# Patient Record
Sex: MALE | Race: WHITE | NOT HISPANIC OR LATINO | Employment: FULL TIME | URBAN - METROPOLITAN AREA
[De-identification: names, ages, dates, MRNs, and addresses within clinical notes are randomized per-mention and may not be internally consistent; named-entity substitution may affect disease eponyms.]

---

## 2018-05-10 ENCOUNTER — OFFICE VISIT (OUTPATIENT)
Dept: FAMILY MEDICINE CLINIC | Facility: CLINIC | Age: 53
End: 2018-05-10
Payer: COMMERCIAL

## 2018-05-10 VITALS
HEIGHT: 70 IN | TEMPERATURE: 98.8 F | HEART RATE: 96 BPM | RESPIRATION RATE: 18 BRPM | SYSTOLIC BLOOD PRESSURE: 124 MMHG | DIASTOLIC BLOOD PRESSURE: 86 MMHG | WEIGHT: 278.4 LBS | BODY MASS INDEX: 39.86 KG/M2

## 2018-05-10 DIAGNOSIS — Z11.59 ENCOUNTER FOR HEPATITIS C SCREENING TEST FOR LOW RISK PATIENT: ICD-10-CM

## 2018-05-10 DIAGNOSIS — L40.9 PSORIASIS: ICD-10-CM

## 2018-05-10 DIAGNOSIS — R73.9 HYPERGLYCEMIA: ICD-10-CM

## 2018-05-10 DIAGNOSIS — Z12.5 SCREENING FOR PROSTATE CANCER: ICD-10-CM

## 2018-05-10 DIAGNOSIS — M72.2 PLANTAR FASCIITIS: ICD-10-CM

## 2018-05-10 DIAGNOSIS — J30.1 ALLERGIC RHINITIS DUE TO POLLEN, UNSPECIFIED SEASONALITY: Primary | ICD-10-CM

## 2018-05-10 DIAGNOSIS — Z00.00 ENCOUNTER FOR MEDICAL EXAMINATION TO ESTABLISH CARE: ICD-10-CM

## 2018-05-10 PROBLEM — Z01.89 PATIENT REQUESTED DIAGNOSTIC TESTING: Status: ACTIVE | Noted: 2018-05-10

## 2018-05-10 PROCEDURE — 99205 OFFICE O/P NEW HI 60 MIN: CPT | Performed by: INTERNAL MEDICINE

## 2018-05-10 RX ORDER — MONTELUKAST SODIUM 10 MG/1
10 TABLET ORAL
Qty: 30 TABLET | Refills: 6 | Status: SHIPPED | OUTPATIENT
Start: 2018-05-10 | End: 2018-12-14 | Stop reason: SDUPTHER

## 2018-05-10 NOTE — PATIENT INSTRUCTIONS
Obtain labs  Treatment for right foot- frozen water bottle, stretch with a band  Change to claritin or allegra once a day  Add singulair (montelukast ) at night for allergy   Consider weight watchers on line for weight management or [facility] managemetn  Limit carbohydrates     Plantar Fasciitis   AMBULATORY CARE:   Plantar fasciitis  is swelling of the plantar fascia  The plantar fascia is a band of fibers that connect your heel bone to the front of your foot  It helps support the arch of your foot and absorbs shock  Plantar fasciitis is caused by small tears in the plantar fascia  Over time, the tears cause swelling and irritation  Signs and symptoms:   · Pain near your heel, especially first thing in the morning    · Pain with prolonged standing, sitting, or walking    · Redness, swelling, or warmth over the injured part of your foot  Contact your healthcare provider or podiatrist if:   · Your pain and swelling increase  · You develop knee, hip, or back pain  · You have questions or concerns about your condition or care  Treatment  may include any of the following:  · Medicines  may be given to decrease swelling and pain  Steroids may be injected into your heel to decrease swelling and pain  · Shoe inserts, splints, or tape  help support your foot and decrease stress on your plantar fascia  A night splint may help stretch your plantar fascia while you sleep  · Stretches and exercises  can help decrease pain and swelling  They can also help strengthen the muscles that support your heel and foot  · Extracorporeal shockwave therapy (ESWT)  uses sound waves to decrease swelling of the plantar fascia  ESWT may be done if other treatments do not work  · Surgery  is rarely needed to separate the plantar fascia from your heel  Self-care:   · Wear your splint or shoe inserts as directed  You may need to wear a splint at night to keep your foot stretched while you sleep   This will help prevent sharp pain first thing in the morning  Shoe inserts will help decrease stress on your plantar fascia when you walk or exercise  · Rest as directed  Rest as much as possible to decrease swelling and prevent more damage  Ask your healthcare provider when you can return to your normal activities  · Apply ice on your plantar fascia  Ice helps prevent tissue damage and decreases swelling and pain  Fill a water bottle with water and freeze it  Wrap a towel around the bottle or cover it with a pillow case  Roll the water bottle under your foot for 10 minutes in the morning and after work  · Massage your plantar fascia as directed  This may help decrease swelling and pain  Roll a golf ball under your foot for 10 minutes  Repeat 3 times each day  · Go to physical therapy as directed  A physical therapist teaches you exercises to help improve movement and strength, and to decrease pain  Prevent plantar fasciitis:   · Maintain a healthy weight  This will help decrease stress on your feet  Ask your healthcare provider how much you should weigh  Ask him to help you create a weight loss plan if you are overweight  · Do low-impact exercises  Low-impact exercises decrease stress on your plantar fascia  Examples include swimming or bicycling  · Start new activities slowly  Increase the intensity and time gradually  · Wear shoes that fit well and support your arch  Replace your shoes before the padding or shock absorption wears out  Do not walk or  bare feet or sandals for long periods of time  · Stretch before you exercise  Ask your healthcare provider how to stretch your plantar fascia and calf muscles  Follow up with your healthcare provider or podiatrist as directed:  Write down your questions so you remember to ask them during your visits     © 2017 [address] Information is for End User's use only and may not be sold, redistributed or otherwise used for commercial purposes  All illustrations and images included in CareNotes® are the copyrighted property of A D A M , Inc  or Jose Brandon  The above information is an  only  It is not intended as medical advice for individual conditions or treatments  Talk to your doctor, nurse or pharmacist before following any medical regimen to see if it is safe and effective for you

## 2018-05-10 NOTE — PROGRESS NOTES
ASSESSMENT and PLAN:  Rodrigo Vergara is a 46 y o  male with:   Problem List Items Addressed This Visit     Allergic rhinitis due to pollen - Primary    Relevant Medications    montelukast (SINGULAIR) 10 mg tablet    Other Relevant Orders    Comprehensive metabolic panel    Psoriasis    Screening for prostate cancer    Relevant Orders    PSA, Total Screen    Encounter for hepatitis C screening test for low risk patient    Relevant Orders    Comprehensive metabolic panel    Hepatitis C antibody    Hepatitis B surface antigen    Hyperglycemia    Relevant Orders    Lipid panel    HEMOGLOBIN A1C W/ EAG ESTIMATION    Plantar fasciitis          SUBJECTIVE:  Rodrigo Vergara is a 46 y o  male who presents today with a chief complaint of Physical Exam and [address]    Patient is here to establish as a patient   He has painin right foot  He is a   He thinks he has plantar fascitis  He has been taking zyrtec for allergy sx  He had been on other meds but was unable to take it due to his job  He has post nasal drip and sinus congestion  He has a hx [id number] herniated disc from previous injury- he exercises to prevent issues  Original injury was 1992  He had colono dr Sreedhar Riddle 3-4 years ago  He would like to know his blood type  He has not had labs done but in the past was "pre-diabetic" - he has lost 15 lbs and is unable to exercise  He has a hx of depression  He was on zoloft and remeron- had parotid tumor- removed  Last tetanus shot was 2010  Pt had mono as a teen- was told he had " a touch of hepatitis"       Review of Systems   Constitutional: Negative  HENT: Positive for postnasal drip and sinus pressure  Negative for congestion, dental problem, drooling, ear discharge, ear pain, facial swelling, mouth sores, nosebleeds, rhinorrhea, sinus pain, sneezing, sore throat, tinnitus and trouble swallowing  Eyes: Negative  Respiratory: Negative      Cardiovascular: Positive for chest pain (left sided ches tdiscomfort- lasted seconds yesterday )  Negative for palpitations and leg swelling  Gastrointestinal: Negative  Endocrine: Negative  Genitourinary: Negative  Musculoskeletal: Positive for arthralgias (pain in right footright foot pain)  Negative for back pain, gait problem, joint swelling, myalgias, neck pain and neck stiffness  Skin: Negative  Allergic/Immunologic: Negative  Neurological: Negative  Hematological: Negative  Psychiatric/Behavioral: Negative  I have reviewed the patient's PMH, Social History, Medication List and Allergies  OBJECTIVE:  /86 (BP Location: Left arm, Patient Position: Sitting, Cuff Size: Adult)   Pulse 96   Temp 98 8 °F (37 1 °C) (Tympanic)   Resp 18   Ht 5' 9 75" (1 772 m)   Wt 126 kg (278 lb 6 4 oz)   BMI 40 23 kg/m²   Physical Exam   Constitutional: He is oriented to person, place, and time  HENT:   Head: Normocephalic and atraumatic  Right Ear: External ear normal    Left Ear: External ear normal    Nose: Nose normal    Mouth/Throat: Oropharynx is clear and moist    Eyes: Conjunctivae and EOM are normal  Pupils are equal, round, and reactive to light  Right eye exhibits no discharge  Left eye exhibits no discharge  Neck: Normal range of motion  Neck supple  No JVD present  No tracheal deviation present  No thyromegaly present  Cardiovascular: Normal rate, regular rhythm, normal heart sounds and intact distal pulses  Exam reveals no friction rub  No murmur heard  Pulmonary/Chest: Effort normal and breath sounds normal  No respiratory distress  He has no wheezes  He has no rales  Abdominal: Soft  Bowel sounds are normal  He exhibits no distension  There is no tenderness  There is no rebound  Musculoskeletal: Normal range of motion  He exhibits no edema, tenderness or deformity  Neurological: He is alert and oriented to person, place, and time  He has normal reflexes  No cranial nerve deficit   Coordination normal    Skin: Skin is warm and dry  No rash noted  No erythema  Psychiatric: He has a normal mood and affect  His behavior is normal  Judgment and thought content normal    Nursing note and vitals reviewed

## 2018-05-12 ENCOUNTER — APPOINTMENT (OUTPATIENT)
Dept: LAB | Facility: CLINIC | Age: 53
End: 2018-05-12
Payer: COMMERCIAL

## 2018-05-12 DIAGNOSIS — Z11.59 ENCOUNTER FOR HEPATITIS C SCREENING TEST FOR LOW RISK PATIENT: ICD-10-CM

## 2018-05-12 DIAGNOSIS — J30.1 ALLERGIC RHINITIS DUE TO POLLEN, UNSPECIFIED SEASONALITY: ICD-10-CM

## 2018-05-12 DIAGNOSIS — R73.9 HYPERGLYCEMIA: ICD-10-CM

## 2018-05-12 DIAGNOSIS — Z00.00 ENCOUNTER FOR MEDICAL EXAMINATION TO ESTABLISH CARE: ICD-10-CM

## 2018-05-12 DIAGNOSIS — Z12.5 SCREENING FOR PROSTATE CANCER: ICD-10-CM

## 2018-05-12 LAB
ALBUMIN SERPL BCP-MCNC: 4.1 G/DL (ref 3.5–5)
ALP SERPL-CCNC: 61 U/L (ref 46–116)
ALT SERPL W P-5'-P-CCNC: 31 U/L (ref 12–78)
ANION GAP SERPL CALCULATED.3IONS-SCNC: 7 MMOL/L (ref 4–13)
AST SERPL W P-5'-P-CCNC: 18 U/L (ref 5–45)
BILIRUB SERPL-MCNC: 1.04 MG/DL (ref 0.2–1)
BUN SERPL-MCNC: 17 MG/DL (ref 5–25)
CALCIUM SERPL-MCNC: 9.2 MG/DL (ref 8.3–10.1)
CHLORIDE SERPL-SCNC: 105 MMOL/L (ref 100–108)
CHOLEST SERPL-MCNC: 166 MG/DL (ref 50–200)
CO2 SERPL-SCNC: 28 MMOL/L (ref 21–32)
CREAT SERPL-MCNC: 1.05 MG/DL (ref 0.6–1.3)
EST. AVERAGE GLUCOSE BLD GHB EST-MCNC: 123 MG/DL
GFR SERPL CREATININE-BSD FRML MDRD: 80 ML/MIN/1.73SQ M
GLUCOSE P FAST SERPL-MCNC: 105 MG/DL (ref 65–99)
HBA1C MFR BLD: 5.9 % (ref 4.2–6.3)
HDLC SERPL-MCNC: 42 MG/DL (ref 40–60)
LDLC SERPL CALC-MCNC: 93 MG/DL (ref 0–100)
NONHDLC SERPL-MCNC: 124 MG/DL
POTASSIUM SERPL-SCNC: 4.4 MMOL/L (ref 3.5–5.3)
PROT SERPL-MCNC: 7.7 G/DL (ref 6.4–8.2)
PSA SERPL-MCNC: 0.8 NG/ML (ref 0–4)
SODIUM SERPL-SCNC: 140 MMOL/L (ref 136–145)
TRIGL SERPL-MCNC: 154 MG/DL

## 2018-05-12 PROCEDURE — G0103 PSA SCREENING: HCPCS

## 2018-05-12 PROCEDURE — 87340 HEPATITIS B SURFACE AG IA: CPT

## 2018-05-12 PROCEDURE — 36415 COLL VENOUS BLD VENIPUNCTURE: CPT

## 2018-05-12 PROCEDURE — 80053 COMPREHEN METABOLIC PANEL: CPT

## 2018-05-12 PROCEDURE — 83036 HEMOGLOBIN GLYCOSYLATED A1C: CPT

## 2018-05-12 PROCEDURE — 80061 LIPID PANEL: CPT

## 2018-05-12 PROCEDURE — 86803 HEPATITIS C AB TEST: CPT

## 2018-05-13 LAB
HBV SURFACE AG SER QL: NORMAL
HCV AB SER QL: NORMAL

## 2018-12-14 DIAGNOSIS — J30.1 ALLERGIC RHINITIS DUE TO POLLEN, UNSPECIFIED SEASONALITY: ICD-10-CM

## 2018-12-14 RX ORDER — MONTELUKAST SODIUM 10 MG/1
10 TABLET ORAL
Qty: 90 TABLET | Refills: 2 | Status: SHIPPED | OUTPATIENT
Start: 2018-12-14 | End: 2019-04-30 | Stop reason: SDUPTHER

## 2018-12-14 NOTE — PROGRESS NOTES
Pt's wife, Melanie Vang, in for a visit today and requesting a refill, approved, schedule 6 month f/u

## 2019-01-14 ENCOUNTER — OFFICE VISIT (OUTPATIENT)
Dept: FAMILY MEDICINE CLINIC | Facility: CLINIC | Age: 54
End: 2019-01-14
Payer: COMMERCIAL

## 2019-01-14 VITALS
WEIGHT: 287 LBS | BODY MASS INDEX: 41.09 KG/M2 | DIASTOLIC BLOOD PRESSURE: 82 MMHG | HEIGHT: 70 IN | RESPIRATION RATE: 16 BRPM | TEMPERATURE: 98 F | HEART RATE: 64 BPM | SYSTOLIC BLOOD PRESSURE: 142 MMHG

## 2019-01-14 DIAGNOSIS — R73.03 PREDIABETES: ICD-10-CM

## 2019-01-14 DIAGNOSIS — E66.01 CLASS 3 SEVERE OBESITY DUE TO EXCESS CALORIES WITHOUT SERIOUS COMORBIDITY WITH BODY MASS INDEX (BMI) OF 40.0 TO 44.9 IN ADULT (HCC): Primary | ICD-10-CM

## 2019-01-14 DIAGNOSIS — R03.0 ELEVATED BP WITHOUT DIAGNOSIS OF HYPERTENSION: ICD-10-CM

## 2019-01-14 DIAGNOSIS — G47.19 DAYTIME HYPERSOMNOLENCE: ICD-10-CM

## 2019-01-14 PROBLEM — Z12.5 SCREENING FOR PROSTATE CANCER: Status: RESOLVED | Noted: 2018-05-10 | Resolved: 2019-01-14

## 2019-01-14 PROBLEM — E66.813 CLASS 3 SEVERE OBESITY DUE TO EXCESS CALORIES WITHOUT SERIOUS COMORBIDITY WITH BODY MASS INDEX (BMI) OF 40.0 TO 44.9 IN ADULT (HCC): Status: ACTIVE | Noted: 2019-01-14

## 2019-01-14 PROBLEM — M72.2 PLANTAR FASCIITIS: Status: RESOLVED | Noted: 2018-05-10 | Resolved: 2019-01-14

## 2019-01-14 PROBLEM — Z11.59 ENCOUNTER FOR HEPATITIS C SCREENING TEST FOR LOW RISK PATIENT: Status: RESOLVED | Noted: 2018-05-10 | Resolved: 2019-01-14

## 2019-01-14 PROBLEM — Z01.89 PATIENT REQUESTED DIAGNOSTIC TESTING: Status: RESOLVED | Noted: 2018-05-10 | Resolved: 2019-01-14

## 2019-01-14 PROCEDURE — 3008F BODY MASS INDEX DOCD: CPT | Performed by: FAMILY MEDICINE

## 2019-01-14 PROCEDURE — 99214 OFFICE O/P EST MOD 30 MIN: CPT | Performed by: FAMILY MEDICINE

## 2019-01-14 NOTE — PATIENT INSTRUCTIONS
Weight Management   AMBULATORY CARE:   Why it is important to manage your weight:  Being overweight increases your risk of health conditions such as heart disease, high blood pressure, type 2 diabetes, and certain types of cancer  It can also increase your risk for osteoarthritis, sleep apnea, and other respiratory problems  Aim for a slow, steady weight loss  Even a small amount of weight loss can lower your risk of health problems  How to lose weight safely:  A safe and healthy way to lose weight is to eat fewer calories and get regular exercise  You can lose up about 1 pound a week by decreasing the number of calories you eat by 500 calories each day  You can decrease calories by eating smaller portion sizes or by cutting out high-calorie foods  Read labels to find out how many calories are in the foods you eat  You can also burn calories with exercise such as walking, swimming, or biking  You will be more likely to keep weight off if you make these changes part of your lifestyle  Healthy meal plan for weight management:  A healthy meal plan includes a variety of foods, contains fewer calories, and helps you stay healthy  A healthy meal plan includes the following:  · Eat whole-grain foods more often  A healthy meal plan should contain fiber  Fiber is the part of grains, fruits, and vegetables that is not broken down by your body  Whole-grain foods are healthy and provide extra fiber in your diet  Some examples of whole-grain foods are whole-wheat breads and pastas, oatmeal, brown rice, and bulgur  · Eat a variety of vegetables every day  Include dark, leafy greens such as spinach, kale, kylah greens, and mustard greens  Eat yellow and orange vegetables such as carrots, sweet potatoes, and winter squash  · Eat a variety of fruits every day  Choose fresh or canned fruit (canned in its own juice or light syrup) instead of juice  Fruit juice has very little or no fiber  · Eat low-fat dairy foods  Drink fat-free (skim) milk or 1% milk  Eat fat-free yogurt and low-fat cottage cheese  Try low-fat cheeses such as mozzarella and other reduced-fat cheeses  · Choose meat and other protein foods that are low in fat  Choose beans or other legumes such as split peas or lentils  Choose fish, skinless poultry (chicken or turkey), or lean cuts of red meat (beef or pork)  Before you cook meat or poultry, cut off any visible fat  · Use less fat and oil  Try baking foods instead of frying them  Add less fat, such as margarine, sour cream, regular salad dressing and mayonnaise to foods  Eat fewer high-fat foods  Some examples of high-fat foods include french fries, doughnuts, ice cream, and cakes  · Eat fewer sweets  Limit foods and drinks that are high in sugar  This includes candy, cookies, regular soda, and sweetened drinks  Ways to decrease calories:   · Eat smaller portions  ¨ Use a small plate with smaller servings  ¨ Do not eat second helpings  ¨ When you eat at a restaurant, ask for a box and place half of your meal in the box before you eat  ¨ Share an entrée with someone else  · Replace high-calorie snacks with healthy, low-calorie snacks  ¨ Choose fresh fruit, vegetables, fat-free rice cakes, or air-popped popcorn instead of potato chips, nuts, or chocolate  ¨ Choose water or calorie-free drinks instead of soda or sweetened drinks  · Eat regular meals  Skipping meals can lead to overeating later in the day  Eat a healthy snack in place of a meal if you do not have time to eat a regular meal      · Do not shop for groceries when you are hungry  You may be more likely to make unhealthy food choices  Take a grocery list of healthy foods and shop after you have eaten  Exercise:  Exercise at least 30 minutes per day on most days of the week  Some examples of exercise include walking, biking, dancing, and swimming   You can also fit in more physical activity by taking the stairs instead of the elevator or parking farther away from stores  Ask your healthcare provider about the best exercise plan for you  Other things to consider as you try to lose weight:   · Be aware of situations that may give you the urge to overeat, such as eating while watching television  Find ways to avoid these situations  For example, read a book, go for a walk, or do crafts  · Meet with a weight loss support group or friends who are also trying to lose weight  This may help you stay motivated to continue working on your weight loss goals  © 2017 [address] [facility] Information is for End User's use only and may not be sold, redistributed or otherwise used for commercial purposes  All illustrations and images included in CareNotes® are the copyrighted property of [employer]  or Jose Brandon  The above information is an  only  It is not intended as medical advice for individual conditions or treatments  Talk to your doctor, nurse or pharmacist before following any medical regimen to see if it is safe and effective for you  DASH Eating Plan   AMBULATORY CARE:   The DASH (Dietary Approaches to Stop Hypertension) Eating Plan  is designed to help prevent or lower high blood pressure  It can also help to lower LDL (bad) cholesterol and decrease your risk for heart disease  The plan is low in sodium, sugar, unhealthy fats, and total fat  It is high in potassium, calcium, magnesium, and fiber  These nutrients are added when you eat more fruits, vegetables, and whole grains  Your sodium limit each day: Your dietitian will tell you how much sodium is safe for you to have each day  People with high blood pressure should have no more than 1,500 to 2,300 mg of sodium in a day  A teaspoon (tsp) of salt has 2,300 mg of sodium  This may seem like a difficult goal, but small changes to the foods you eat can make a big difference   Your healthcare provider or dietitian can help you create a meal plan that follows your sodium limit  How to limit sodium:   · Read food labels  Food labels can help you choose foods that are low in sodium  The amount of sodium is listed in milligrams (mg)  The % Daily Value (DV) column tells you how much of your daily needs are met by 1 serving of the food for each nutrient listed  Choose foods that have less than 5% of the DV of sodium  These foods are considered low in sodium  Foods that have 20% or more of the DV of sodium are considered high in sodium  Avoid foods that have more than 300 mg of sodium in each serving  Choose foods that say low-sodium, reduced-sodium, or no salt added on the food label  · Avoid salt  Do not salt food at the table, and add very little salt to foods during cooking  Use herbs and spices, such as onions, garlic, and salt-free seasonings to add flavor to foods  Try lemon or lime juice or vinegar to give foods a tart flavor  Use hot peppers or a small amount of hot pepper sauce to add a spicy flavor to foods  · Ask about salt substitutes  Ask your healthcare provider if you may use salt substitutes  Some salt substitutes have ingredients that can be harmful if you have certain health conditions  · Choose foods carefully at restaurants  Meals from restaurants, especially fast food restaurants, are often high in sodium  Some restaurants have nutrition information that tells you the amount of sodium in their foods  Ask to have your food prepared with less, or no salt  What you need to know about fats:   · Include healthy fats  Examples are unsaturated fats and omega-3 fatty acids  Unsaturated fats are found in soybean, canola, olive, or sunflower oil, and liquid and soft tub margarines  Omega-3 fatty acids are found in fatty fish, such as salmon, tuna, mackerel, and sardines  It is also found in flaxseed oil and ground flaxseed  · Avoid unhealthy fats    Do not eat unhealthy fats, such as saturated fats and trans fats  Saturated fats are found in foods that contain fat from animals  Examples are fatty meats, whole milk, butter, cream, and other dairy foods  It is also found in shortening, stick margarine, palm oil, and coconut oil  Trans fats are found in fried foods, crackers, chips, and baked goods made with margarine or shortening  Foods to include: With the DASH eating plan, you need to eat a certain number of servings from each food group  This will help you get enough of certain nutrients and limit others  The amount of servings you should eat depends on how many calories you need  Your dietitian can tell you how many calories you need  The number of servings listed next to the food groups below are for people who need about 2,000 calories each day    · Grains:  6 to 8 servings (3 of these servings should be whole-grain foods)    ¨ 1 slice of whole-grain bread     ¨ 1 ounce of dry cereal    ¨ ½ cup of cooked cereal, pasta, or brown rice    · Vegetables and fruits:  4 to 5 servings of fruits and 4 to 5 servings of vegetables    ¨ 1 medium fruit    ¨ ½ cup of frozen, canned (no added salt), or chopped fresh vegetables     ¨ ½ cup of fresh, frozen, dried, or canned fruit (canned in light syrup or fruit juice)    ¨ ½ cup of vegetable or fruit juice    · Dairy:  2 to 3 servings    ¨ 1 cup of nonfat (skim) or 1% milk    ¨ 1½ ounces of fat-free or low-fat cheese    ¨ 6 ounces of nonfat or low-fat yogurt    · Lean meat, poultry, and fish:  6 ounces or less    [employer] (chicken, turkey) with no skin    ¨ Fish (especially fatty fish, such as salmon, fresh tuna, or mackerel)    ¨ Lean beef and pork (loin, round, extra lean hamburger)    ¨ Egg whites and egg substitutes    · Nuts, seeds, and legumes:  4 to 5 servings each week    ¨ ½ cup of cooked beans and peas    ¨ 1½ ounces of unsalted nuts    ¨ 2 tablespoons of peanut butter or seeds    · Sweets and added sugars:  5 or less each week    ¨ 1 tablespoon of sugar, jelly, or jam    ¨ ½ cup of sorbet or gelatin    ¨ 1 cup of lemonade    · Fats:  2 to 3 servings each week    ¨ 1 teaspoon of soft margarine or vegetable oil    ¨ 1 tablespoon of mayonnaise    ¨ 2 tablespoons of salad dressing  Foods to avoid:   · Grains:      [employer], such as doughnuts, pastries, cookies, and biscuits (high in fat and sugar)    ¨ Mixes for cornbread and biscuits, packaged foods, such as bread stuffing, rice and pasta mixes, macaroni and cheese, and instant cereals (high in sodium)    · Fruits and vegetables:      ¨ Regular, canned vegetables (high in sodium)    ¨ Sauerkraut, pickled vegetables, and other foods prepared in brine (high in sodium)    ¨ Fried vegetables or vegetables in butter or high-fat sauces    ¨ Fruit in cream or butter sauce (high in fat)    · Dairy:      ¨ Whole milk, 2% milk, and cream (high in fat)    ¨ Regular cheese and processed cheese (high in fat and sodium)    · Meats and protein foods:      ¨ Smoked or cured meat, such as corned beef, benedict, ham, hot dogs, and sausage (high in fat and sodium)    ¨ Canned beans and canned meats or spreads, such as potted meats, sardines, anchovies, and imitation seafood (high in sodium)    ¨ Deli or lunch meats, such as bologna, ham, turkey, and roast beef (high in sodium)    ¨ High-fat meat (T-bone steak, regular hamburger, and ribs)    ¨ Whole eggs and egg yolks (high in fat)    · Other:      ¨ Seasonings made with salt, such as garlic salt, celery salt, onion salt, seasoned salt, meat tenderizers, and monosodium glutamate (MSG)    ¨ Miso soup and canned or dried soup mixes (high in sodium)    ¨ Regular soy sauce, barbecue sauce, teriyaki sauce, steak sauce, Worcestershire sauce, and most flavored vinegars (high in sodium)    ¨ Regular condiments, such as mustard, ketchup, and salad dressings (high in sodium)    ¨ Gravy and sauces, such as Steven or cheese sauces (high in sodium and fat)    ¨ Drinks high in sugar, such as soda or fruit drinks    ¨ Snack foods, such as salted chips, popcorn, pretzels, pork rinds, salted crackers, and salted nuts    ¨ Frozen foods, such as dinners, entrees, vegetables with sauces, and breaded meats (high in sodium)  Other guidelines to follow:   · Maintain a healthy weight  Your risk for heart disease is higher if you are overweight  Your healthcare provider may suggest that you lose weight if you are overweight  You can lose weight by eating fewer calories and foods that have added sugars and fat  The DASH meal plan can help you do this  Decrease calories by eating smaller portions at each meal and fewer snacks  Ask your healthcare provider for more information about how to lose weight  · Exercise regularly  Regular exercise can help you reach or maintain a healthy weight  Regular exercise can also help decrease your blood pressure and improve your cholesterol levels  Get 30 minutes or more of moderate exercise each day of the week  To lose weight, get at least 60 minutes of exercise  Talk to your healthcare provider about the best exercise program for you  · Limit alcohol  Women should limit alcohol to 1 drink a day  Men should limit alcohol to 2 drinks a day  A drink of alcohol is 12 ounces of beer, 5 ounces of wine, or 1½ ounces of liquor  © 2017 [address] [facility] Information is for End User's use only and may not be sold, redistributed or otherwise used for commercial purposes  All illustrations and images included in CareNotes® are the copyrighted property of [employer]  or Jose Brandon  The above information is an  only  It is not intended as medical advice for individual conditions or treatments  Talk to your doctor, nurse or pharmacist before following any medical regimen to see if it is safe and effective for you

## 2019-01-14 NOTE — PROGRESS NOTES
FAMILY MEDICINE PROGRESS NOTE  Nirmala Palumbo 53 y o  male   DATE: January 14, 2019     ASSESSMENT and PLAN:  Nirmala Palumbo is a 53 y o  male with:     Class 3 severe obesity due to excess calories without serious comorbidity with body mass index (BMI) of 40 0 to 44 9 in adult [facility])  Wt Readings from Last 3 Encounters:   01/14/19 130 kg (287 lb)   05/10/18 126 kg (278 lb 6 4 oz)     BMI Counseling: Body mass index is 41 48 kg/m²  Discussed the patient's BMI with him  The BMI is above average  BMI counseling and education was provided to the patient  Nutrition recommendations include reducing portion sizes, decreasing overall calorie intake, 3-5 servings of fruits/vegetables daily, consuming healthier snacks, decreasing soda and/or juice intake, moderation in carbohydrate intake and reducing intake of cholesterol  Exercise recommendations include moderate aerobic physical activity for 150 minutes/week and exercising 3-5 times per week  Prediabetes  Lab Results   Component Value Date    HGBA1C 5 9 05/12/2018     Nutrition referral, reviewed healthy diet and weight loss, recheck at next follow-up    Elevated BP without diagnosis of hypertension  BP Readings from Last 3 Encounters:   01/14/19 142/82   05/10/18 124/86     Return for nurse visit in 2-4 weeks to recheck blood pressure  Attempt weight loss, given handout for dash diet, reviewed lifestyle modifications    Daytime hypersomnolence  Patient has daytime hypersomnolence, increased weight with morbid obesity, increased neck girth and abdominal obesity with known snoring, no witnessed apneic events though  [place] sleepiness Score of 8  Patient is required to have a sleep study per DOT requirements  Order placed, if positive, will have to follow up with Sleep Medicine  Reviewed weight loss and lifestyle modifications to decrease risk  RTC 2-4 weeks for nurse visit for BP check  Return to clinic 3 months or sooner p r n      SUBJECTIVE:  Nirmala Palumbo is a 53 y o  male who presents today with a chief complaint of Weight Gain and Sleep Apnea (Discuss sleep apnea per DOT Physicain)  Pt is here for possible sleep apnea  He works as a  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 [employer] and recently had a DOT physical and told because of his weight/neck girth/body mass  They told him he needs a sleep study  He was given a 3 month card until he gets his study done  Over the last 2 months, he has been struggling with a ruptured tendon  During this time he had been inactive, and drinking more alcohol  His wife says he snores, and he is tired/sleepy during the daytime  Current alcohol use: 3 beers, 2 shots/weekend      Review of Systems   Eyes: Negative for visual disturbance  Respiratory: Negative for shortness of breath  Cardiovascular: Negative for chest pain  Neurological: Negative for headaches  I have reviewed the patient's PMH, Social History, Medication List and Allergies as appropriate  Sitting and reading: Would never doze  Watching TV: Moderate chance of dozing  Sitting, inactive in a public place (e g  a theatre or a meeting): Would never doze  As a passenger in a car for an hour without a break: High chance of dozing  Lying down to rest in the afternoon when circumstances permit: High chance of dozing  Sitting and talking to someone: Would never doze  Sitting quietly after a lunch without alcohol: Would never doze  In a car, while stopped for a few minutes in traffic: Would never doze  Total score: 8    OBJECTIVE:  /82 (BP Location: Left arm, Patient Position: Sitting, Cuff Size: Large)   Pulse 64   Temp 98 °F (36 7 °C)   Resp 16   Ht 5' 9 75" (1 772 m)   Wt 130 kg (287 lb)   BMI 41 48 kg/m²    Physical Exam   Constitutional: He appears well-developed and well-nourished  No distress  obese   Cardiovascular: Normal rate, regular rhythm and normal heart sounds  Pulmonary/Chest: Effort normal and breath sounds normal  No respiratory distress   He has no wheezes  He has no rales  Skin: He is not diaphoretic  Vitals reviewed  Yohannes Mckenzie MD    Note: Portions of the record may have been created with voice recognition software  Occasional wrong word or "sound a like" substitutions may have occurred due to the inherent limitations of voice recognition software  Read the chart carefully and recognize, using context, where substitutions have occurred

## 2019-01-14 NOTE — ASSESSMENT & PLAN NOTE
Lab Results   Component Value Date    HGBA1C 5 9 05/12/2018     Nutrition referral, reviewed healthy diet and weight loss, recheck at next follow-up

## 2019-01-14 NOTE — ASSESSMENT & PLAN NOTE
BP Readings from Last 3 Encounters:   01/14/19 142/82   05/10/18 124/86     Return for nurse visit in 2-4 weeks to recheck blood pressure  Attempt weight loss, given handout for dash diet, reviewed lifestyle modifications

## 2019-01-14 NOTE — ASSESSMENT & PLAN NOTE
Patient has daytime hypersomnolence, increased weight with morbid obesity, increased neck girth and abdominal obesity with known snoring, no witnessed apneic events though  [place] sleepiness Score of 8  Patient is required to have a sleep study per DOT requirements  Order placed, if positive, will have to follow up with Sleep Medicine  Reviewed weight loss and lifestyle modifications to decrease risk

## 2019-01-14 NOTE — ASSESSMENT & PLAN NOTE
Wt Readings from Last 3 Encounters:   01/14/19 130 kg (287 lb)   05/10/18 126 kg (278 lb 6 4 oz)     BMI Counseling: Body mass index is 41 48 kg/m²  Discussed the patient's BMI with him  The BMI is above average  BMI counseling and education was provided to the patient  Nutrition recommendations include reducing portion sizes, decreasing overall calorie intake, 3-5 servings of fruits/vegetables daily, consuming healthier snacks, decreasing soda and/or juice intake, moderation in carbohydrate intake and reducing intake of cholesterol  Exercise recommendations include moderate aerobic physical activity for 150 minutes/week and exercising 3-5 times per week

## 2019-01-17 ENCOUNTER — TELEPHONE (OUTPATIENT)
Dept: SLEEP CENTER | Facility: CLINIC | Age: 54
End: 2019-01-17

## 2019-01-17 NOTE — TELEPHONE ENCOUNTER
----- Message from Jose C Brooks DO sent at 1/17/2019  1:50 PM EST -----  Chart reviewed  Study approved   ----- Message -----  From: Suri French  Sent: 1/16/2019   8:56 AM  To: Jose C Brooks DO    This sleep study needs approval      If approved please sign and return to clerical pool  If denied please include reasons why  Also provide alternative testing if warranted  Please sign and return to clerical pool

## 2019-02-05 ENCOUNTER — TELEPHONE (OUTPATIENT)
Dept: FAMILY MEDICINE CLINIC | Facility: CLINIC | Age: 54
End: 2019-02-05

## 2019-02-05 DIAGNOSIS — G47.19 DAYTIME HYPERSOMNOLENCE: Primary | ICD-10-CM

## 2019-02-05 NOTE — TELEPHONE ENCOUNTER
Called [facility] services  Patient's in lab study denied due to no witnessed apnea episodes  Can we change this to home study please?

## 2019-02-05 NOTE — TELEPHONE ENCOUNTER
I called patient, he saw Dr Guru Lindquist at [address] for  [phone]   I called their office and spoke with Roselyn Sanchez, the DOT will accept a home study, please change

## 2019-02-05 NOTE — TELEPHONE ENCOUNTER
Call pt and make sure home study is still ok for his DOT requirements, if they will accept a home study, then I'll proceed, thanks

## 2019-02-05 NOTE — TELEPHONE ENCOUNTER
Called central scheduling, cancelled diagnostic  Scheduled Home study for April 2/3rd at [facility]   Called patient and advised of changes

## 2019-02-07 ENCOUNTER — TELEPHONE (OUTPATIENT)
Dept: SLEEP CENTER | Facility: CLINIC | Age: 54
End: 2019-02-07

## 2019-02-07 NOTE — TELEPHONE ENCOUNTER
----- Message from Марина Bryant MD sent at 2/7/2019 11:20 AM EST -----  Approved  ----- Message -----  From: Saundra Yu: 2/7/2019   9:47 AM  To: Sleep Medicine [facility] [place], #    PLEASE REVIEW FOR APPROVAL OR DENIAL AND WHY

## 2019-03-15 ENCOUNTER — TRANSCRIBE ORDERS (OUTPATIENT)
Dept: SLEEP CENTER | Facility: CLINIC | Age: 54
End: 2019-03-15

## 2019-03-17 ENCOUNTER — HOSPITAL ENCOUNTER (OUTPATIENT)
Dept: SLEEP CENTER | Facility: CLINIC | Age: 54
Discharge: HOME | End: 2019-03-17
Payer: COMMERCIAL

## 2019-03-17 DIAGNOSIS — G47.19 DAYTIME HYPERSOMNOLENCE: ICD-10-CM

## 2019-03-17 PROCEDURE — G0399 HOME SLEEP TEST/TYPE 3 PORTA: HCPCS

## 2019-03-19 ENCOUNTER — TELEPHONE (OUTPATIENT)
Dept: FAMILY MEDICINE CLINIC | Facility: CLINIC | Age: 54
End: 2019-03-19

## 2019-03-19 DIAGNOSIS — G47.33 OSA (OBSTRUCTIVE SLEEP APNEA): Primary | ICD-10-CM

## 2019-03-19 PROBLEM — G47.19 DAYTIME HYPERSOMNOLENCE: Status: RESOLVED | Noted: 2019-01-14 | Resolved: 2019-03-19

## 2019-03-19 NOTE — TELEPHONE ENCOUNTER
Please call Shanika Avina and let him know that I reviewed his sleep study was positive for mild to moderate ANNABEL  I would have him talk to sleep medicine about options, CPAP versus mandibular device  Weight loss will also help with this diagnosis  Thank you!

## 2019-03-20 ENCOUNTER — TELEPHONE (OUTPATIENT)
Dept: SLEEP CENTER | Facility: CLINIC | Age: 54
End: 2019-03-20

## 2019-03-20 NOTE — TELEPHONE ENCOUNTER
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

## 2019-03-22 ENCOUNTER — TELEPHONE (OUTPATIENT)
Dept: FAMILY MEDICINE CLINIC | Facility: CLINIC | Age: 54
End: 2019-03-22

## 2019-03-22 NOTE — TELEPHONE ENCOUNTER
Regarding: Test Results Question  Contact: [phone]  ----- Message from [address] [place] [address] [address], Generic sent at 3/22/2019  4:23 PM EDT -----    I have a question about HOME STUDY resulted on 3/18/19 at 18:13  Not so much a question  Just that the [facility]  Doctor responsible for my physical has received the sleep study results via fax and issued me a 2 year physical card  This is the best results that could have been hoped for  I am still going to follow up with the sleep center and continue to diet/loose weight  Thank you for your care, and also to the entire staff at your office

## 2019-04-22 ENCOUNTER — OFFICE VISIT (OUTPATIENT)
Dept: SLEEP CENTER | Facility: CLINIC | Age: 54
End: 2019-04-22
Payer: COMMERCIAL

## 2019-04-22 VITALS
SYSTOLIC BLOOD PRESSURE: 100 MMHG | DIASTOLIC BLOOD PRESSURE: 70 MMHG | BODY MASS INDEX: 39.4 KG/M2 | HEIGHT: 70 IN | WEIGHT: 275.25 LBS

## 2019-04-22 DIAGNOSIS — J30.1 ALLERGIC RHINITIS DUE TO POLLEN, UNSPECIFIED SEASONALITY: ICD-10-CM

## 2019-04-22 DIAGNOSIS — E66.01 CLASS 3 SEVERE OBESITY DUE TO EXCESS CALORIES WITHOUT SERIOUS COMORBIDITY WITH BODY MASS INDEX (BMI) OF 40.0 TO 44.9 IN ADULT (HCC): ICD-10-CM

## 2019-04-22 DIAGNOSIS — G47.33 OSA (OBSTRUCTIVE SLEEP APNEA): Primary | ICD-10-CM

## 2019-04-22 PROCEDURE — 99244 OFF/OP CNSLTJ NEW/EST MOD 40: CPT | Performed by: INTERNAL MEDICINE

## 2019-04-30 ENCOUNTER — OFFICE VISIT (OUTPATIENT)
Dept: FAMILY MEDICINE CLINIC | Facility: CLINIC | Age: 54
End: 2019-04-30
Payer: COMMERCIAL

## 2019-04-30 VITALS
OXYGEN SATURATION: 97 % | TEMPERATURE: 97 F | RESPIRATION RATE: 15 BRPM | HEIGHT: 70 IN | WEIGHT: 274 LBS | HEART RATE: 82 BPM | SYSTOLIC BLOOD PRESSURE: 130 MMHG | BODY MASS INDEX: 39.22 KG/M2 | DIASTOLIC BLOOD PRESSURE: 76 MMHG

## 2019-04-30 DIAGNOSIS — E66.01 CLASS 2 SEVERE OBESITY DUE TO EXCESS CALORIES WITH SERIOUS COMORBIDITY AND BODY MASS INDEX (BMI) OF 39.0 TO 39.9 IN ADULT (HCC): ICD-10-CM

## 2019-04-30 DIAGNOSIS — R73.03 PREDIABETES: ICD-10-CM

## 2019-04-30 DIAGNOSIS — G47.33 OSA (OBSTRUCTIVE SLEEP APNEA): Primary | ICD-10-CM

## 2019-04-30 DIAGNOSIS — R03.0 ELEVATED BP WITHOUT DIAGNOSIS OF HYPERTENSION: ICD-10-CM

## 2019-04-30 DIAGNOSIS — E78.2 MIXED HYPERLIPIDEMIA: ICD-10-CM

## 2019-04-30 DIAGNOSIS — J30.1 ALLERGIC RHINITIS DUE TO POLLEN, UNSPECIFIED SEASONALITY: ICD-10-CM

## 2019-04-30 PROBLEM — E66.812 CLASS 2 SEVERE OBESITY DUE TO EXCESS CALORIES WITH SERIOUS COMORBIDITY AND BODY MASS INDEX (BMI) OF 39.0 TO 39.9 IN ADULT (HCC): Status: ACTIVE | Noted: 2019-01-14

## 2019-04-30 PROCEDURE — 99214 OFFICE O/P EST MOD 30 MIN: CPT | Performed by: FAMILY MEDICINE

## 2019-04-30 PROCEDURE — 1111F DSCHRG MED/CURRENT MED MERGE: CPT | Performed by: FAMILY MEDICINE

## 2019-04-30 PROCEDURE — 3008F BODY MASS INDEX DOCD: CPT | Performed by: FAMILY MEDICINE

## 2019-04-30 RX ORDER — MONTELUKAST SODIUM 10 MG/1
10 TABLET ORAL
Qty: 90 TABLET | Refills: 1 | Status: SHIPPED | OUTPATIENT
Start: 2019-04-30 | End: 2019-09-09 | Stop reason: SDUPTHER

## 2019-05-20 ENCOUNTER — APPOINTMENT (OUTPATIENT)
Dept: LAB | Facility: CLINIC | Age: 54
End: 2019-05-20
Payer: COMMERCIAL

## 2019-05-20 DIAGNOSIS — R03.0 ELEVATED BP WITHOUT DIAGNOSIS OF HYPERTENSION: ICD-10-CM

## 2019-05-20 DIAGNOSIS — E78.2 MIXED HYPERLIPIDEMIA: ICD-10-CM

## 2019-05-20 DIAGNOSIS — R73.03 PREDIABETES: ICD-10-CM

## 2019-05-20 LAB
ANION GAP SERPL CALCULATED.3IONS-SCNC: 9 MMOL/L (ref 4–13)
BUN SERPL-MCNC: 11 MG/DL (ref 5–25)
CALCIUM SERPL-MCNC: 8.8 MG/DL (ref 8.3–10.1)
CHLORIDE SERPL-SCNC: 105 MMOL/L (ref 100–108)
CHOLEST SERPL-MCNC: 164 MG/DL (ref 50–200)
CO2 SERPL-SCNC: 27 MMOL/L (ref 21–32)
CREAT SERPL-MCNC: 0.98 MG/DL (ref 0.6–1.3)
EST. AVERAGE GLUCOSE BLD GHB EST-MCNC: 120 MG/DL
GFR SERPL CREATININE-BSD FRML MDRD: 86 ML/MIN/1.73SQ M
GLUCOSE P FAST SERPL-MCNC: 96 MG/DL (ref 65–99)
HBA1C MFR BLD: 5.8 % (ref 4.2–6.3)
HDLC SERPL-MCNC: 44 MG/DL (ref 40–60)
LDLC SERPL CALC-MCNC: 104 MG/DL (ref 0–100)
POTASSIUM SERPL-SCNC: 4.6 MMOL/L (ref 3.5–5.3)
SODIUM SERPL-SCNC: 141 MMOL/L (ref 136–145)
TRIGL SERPL-MCNC: 79 MG/DL

## 2019-05-20 PROCEDURE — 80061 LIPID PANEL: CPT

## 2019-05-20 PROCEDURE — 83036 HEMOGLOBIN GLYCOSYLATED A1C: CPT

## 2019-05-20 PROCEDURE — 36415 COLL VENOUS BLD VENIPUNCTURE: CPT

## 2019-05-20 PROCEDURE — 80048 BASIC METABOLIC PNL TOTAL CA: CPT

## 2019-07-02 DIAGNOSIS — Z12.11 COLON CANCER SCREENING: Primary | ICD-10-CM

## 2019-07-08 ENCOUNTER — TELEPHONE (OUTPATIENT)
Dept: FAMILY MEDICINE CLINIC | Facility: CLINIC | Age: 54
End: 2019-07-08

## 2019-07-08 NOTE — TELEPHONE ENCOUNTER
Patient said he had a colonsocopy competed in 2014 by Dr Adele Meza in [facility]   He was told he did not have to come backfor 10 years, he got a clean report  Does he have to repeat colonoscopy?     Dr Adele Meza  [phone]

## 2019-07-10 NOTE — TELEPHONE ENCOUNTER
Report received & scanned to VB from Dr Anna Naidu office  Dr Mady Simons reviewed report and recommended a 10 year recheck  So patient is not due until 2023

## 2019-09-09 DIAGNOSIS — J30.1 ALLERGIC RHINITIS DUE TO POLLEN, UNSPECIFIED SEASONALITY: ICD-10-CM

## 2019-09-09 RX ORDER — MONTELUKAST SODIUM 10 MG/1
10 TABLET ORAL
Qty: 90 TABLET | Refills: 1 | Status: SHIPPED | OUTPATIENT
Start: 2019-09-09 | End: 2020-02-05 | Stop reason: SDUPTHER

## 2019-09-09 NOTE — TELEPHONE ENCOUNTER
Patient requesting a RX refill on     SINGULAIR 10 mg daily at bedtime #[id number] 4/30/19

## 2020-02-05 DIAGNOSIS — J30.1 ALLERGIC RHINITIS DUE TO POLLEN, UNSPECIFIED SEASONALITY: ICD-10-CM

## 2020-02-05 RX ORDER — MONTELUKAST SODIUM 10 MG/1
10 TABLET ORAL
Qty: 90 TABLET | Refills: 0 | Status: SHIPPED | OUTPATIENT
Start: 2020-02-05 | End: 2020-02-19 | Stop reason: SDUPTHER

## 2020-02-19 ENCOUNTER — APPOINTMENT (OUTPATIENT)
Dept: LAB | Facility: CLINIC | Age: 55
End: 2020-02-19
Payer: COMMERCIAL

## 2020-02-19 ENCOUNTER — OFFICE VISIT (OUTPATIENT)
Dept: FAMILY MEDICINE CLINIC | Facility: CLINIC | Age: 55
End: 2020-02-19
Payer: COMMERCIAL

## 2020-02-19 VITALS
TEMPERATURE: 98.5 F | SYSTOLIC BLOOD PRESSURE: 124 MMHG | WEIGHT: 262.4 LBS | HEART RATE: 70 BPM | OXYGEN SATURATION: 98 % | BODY MASS INDEX: 37.56 KG/M2 | HEIGHT: 70 IN | DIASTOLIC BLOOD PRESSURE: 70 MMHG

## 2020-02-19 DIAGNOSIS — J30.1 ALLERGIC RHINITIS DUE TO POLLEN, UNSPECIFIED SEASONALITY: ICD-10-CM

## 2020-02-19 DIAGNOSIS — Z12.5 SCREENING FOR PROSTATE CANCER: ICD-10-CM

## 2020-02-19 DIAGNOSIS — R73.01 IMPAIRED FASTING GLUCOSE: ICD-10-CM

## 2020-02-19 DIAGNOSIS — R73.01 IMPAIRED FASTING GLUCOSE: Primary | ICD-10-CM

## 2020-02-19 DIAGNOSIS — E78.2 MIXED HYPERLIPIDEMIA: ICD-10-CM

## 2020-02-19 PROBLEM — R73.03 PREDIABETES: Status: RESOLVED | Noted: 2018-05-10 | Resolved: 2020-02-19

## 2020-02-19 LAB
ALBUMIN SERPL BCP-MCNC: 4.3 G/DL (ref 3.5–5)
ALP SERPL-CCNC: 65 U/L (ref 46–116)
ALT SERPL W P-5'-P-CCNC: 33 U/L (ref 12–78)
ANION GAP SERPL CALCULATED.3IONS-SCNC: 5 MMOL/L (ref 4–13)
AST SERPL W P-5'-P-CCNC: 18 U/L (ref 5–45)
BILIRUB SERPL-MCNC: 1.05 MG/DL (ref 0.2–1)
BUN SERPL-MCNC: 15 MG/DL (ref 5–25)
CALCIUM SERPL-MCNC: 9.5 MG/DL (ref 8.3–10.1)
CHLORIDE SERPL-SCNC: 109 MMOL/L (ref 100–108)
CHOLEST SERPL-MCNC: 179 MG/DL (ref 50–200)
CO2 SERPL-SCNC: 28 MMOL/L (ref 21–32)
CREAT SERPL-MCNC: 1.01 MG/DL (ref 0.6–1.3)
GFR SERPL CREATININE-BSD FRML MDRD: 83 ML/MIN/1.73SQ M
GLUCOSE P FAST SERPL-MCNC: 120 MG/DL (ref 65–99)
HDLC SERPL-MCNC: 46 MG/DL
LDLC SERPL CALC-MCNC: 103 MG/DL (ref 0–100)
NONHDLC SERPL-MCNC: 133 MG/DL
POTASSIUM SERPL-SCNC: 4.6 MMOL/L (ref 3.5–5.3)
PROT SERPL-MCNC: 7.9 G/DL (ref 6.4–8.2)
PSA SERPL-MCNC: 0.8 NG/ML (ref 0–4)
SL AMB POCT HEMOGLOBIN AIC: 5.6 (ref ?–6.5)
SODIUM SERPL-SCNC: 142 MMOL/L (ref 136–145)
TRIGL SERPL-MCNC: 149 MG/DL

## 2020-02-19 PROCEDURE — G0103 PSA SCREENING: HCPCS

## 2020-02-19 PROCEDURE — 80061 LIPID PANEL: CPT

## 2020-02-19 PROCEDURE — 83036 HEMOGLOBIN GLYCOSYLATED A1C: CPT | Performed by: FAMILY MEDICINE

## 2020-02-19 PROCEDURE — 99213 OFFICE O/P EST LOW 20 MIN: CPT | Performed by: FAMILY MEDICINE

## 2020-02-19 PROCEDURE — 36415 COLL VENOUS BLD VENIPUNCTURE: CPT

## 2020-02-19 PROCEDURE — 80053 COMPREHEN METABOLIC PANEL: CPT

## 2020-02-19 PROCEDURE — 1036F TOBACCO NON-USER: CPT | Performed by: FAMILY MEDICINE

## 2020-02-19 PROCEDURE — 3008F BODY MASS INDEX DOCD: CPT | Performed by: FAMILY MEDICINE

## 2020-02-19 RX ORDER — MONTELUKAST SODIUM 10 MG/1
10 TABLET ORAL
Qty: 90 TABLET | Refills: 2 | Status: SHIPPED | OUTPATIENT
Start: 2020-02-19 | End: 2020-08-26 | Stop reason: SDUPTHER

## 2020-02-19 NOTE — PROGRESS NOTES
Assessment/Plan:    No problem-specific Assessment & Plan notes found for this encounter  Diagnoses and all orders for this visit:    Impaired fasting glucose  -     POCT hemoglobin A1c, 5 6 normal recommend to continue a low carb diet  -     Comprehensive metabolic panel; Future    Mixed hyperlipidemia  -     Lipid panel; Future    Screening for prostate cancer  -     PSA, Total Screen; Future      Follow up in 1 year or as needed    Subjective:      Patient ID: Mohit Luo is a 63 y o  male  Patient is here to establish care  He has a history of impaired fasting glucose and is trying to lose weight and has lose close to 30 lbs in the last 1 year  He has a history of sleep apnea and saw his sleep doctor last year and was told that if he kept losing the weight he might not need treatment  Smoked for 15-20 years however quit  Has a history of right parotid gland cancer removed  The following portions of the patient's history were reviewed and updated as appropriate:   He  has a past medical history of Allergic (Almost life long sinus  Penicillin ), Hearing deficit, bilateral, HL (hearing loss) ([place] age), Lumbar herniated disc, Obesity (2006), and Psoriasis  He   Patient Active Problem List    Diagnosis Date Noted    Impaired fasting glucose 02/19/2020    Mixed hyperlipidemia 04/30/2019    ANNABEL (obstructive sleep apnea)     Class 2 severe obesity due to excess calories with serious comorbidity and body mass index (BMI) of 39 0 to 39 9 in adult [facility]) 01/14/2019    Allergic rhinitis due to pollen 05/10/2018    Psoriasis 05/10/2018    Screening for prostate cancer 05/10/2018     He  has a past surgical history that includes Hernia repair and Parotidectomy  His family history includes Heart murmur in his father; Hypertension in his mother; Psoriasis in his father, sister, and sister; Rheum arthritis in his mother; Stroke in his father; Thyroid disease in his sister    He  reports that he quit smoking about 17 years ago  His smoking use included cigarettes  He has a 20 00 pack-year smoking history  He has never used smokeless tobacco  He reports that he drinks about 12 0 standard drinks of alcohol per week  He reports that he does not use drugs  Current Outpatient Medications   Medication Sig Dispense Refill    montelukast (SINGULAIR) 10 mg tablet Take 1 tablet (10 mg total) by mouth daily at bedtime 90 tablet 0     No current facility-administered medications for this visit  Current Outpatient Medications on File Prior to Visit   Medication Sig    montelukast (SINGULAIR) 10 mg tablet Take 1 tablet (10 mg total) by mouth daily at bedtime     No current facility-administered medications on file prior to visit  He is allergic to penicillins       Review of Systems   Constitutional: Negative for activity change, appetite change, fatigue and fever  HENT: Negative for congestion and ear discharge  Respiratory: Negative for cough and shortness of breath  Cardiovascular: Negative for chest pain and palpitations  Gastrointestinal: Negative for diarrhea and nausea  Musculoskeletal: Negative for arthralgias and back pain  Skin: Negative for color change and rash  Neurological: Negative for dizziness and headaches  Psychiatric/Behavioral: Negative for agitation and behavioral problems  Objective:      /70   Pulse 70   Temp 98 5 °F (36 9 °C)   Ht 5' 10" (1 778 m)   Wt 119 kg (262 lb 6 4 oz)   SpO2 98%   BMI 37 65 kg/m²          Physical Exam   Constitutional: He is oriented to person, place, and time  He appears well-developed and well-nourished  No distress  Eyes: Pupils are equal, round, and reactive to light  No scleral icterus  Cardiovascular: Normal rate, regular rhythm and normal heart sounds  No murmur heard  Pulmonary/Chest: Effort normal and breath sounds normal  No respiratory distress  He has no wheezes  Abdominal: Soft   Bowel sounds are normal  He exhibits no distension  There is no tenderness  Neurological: He is alert and oriented to person, place, and time  Skin: Skin is warm and dry  No rash noted  He is not diaphoretic  Psychiatric: He has a normal mood and affect

## 2020-08-26 DIAGNOSIS — J30.1 ALLERGIC RHINITIS DUE TO POLLEN, UNSPECIFIED SEASONALITY: ICD-10-CM

## 2020-08-26 RX ORDER — MONTELUKAST SODIUM 10 MG/1
10 TABLET ORAL
Qty: 90 TABLET | Refills: 2 | Status: SHIPPED | OUTPATIENT
Start: 2020-08-26 | End: 2021-02-12 | Stop reason: SDUPTHER

## 2021-02-11 ENCOUNTER — LAB (OUTPATIENT)
Dept: LAB | Facility: CLINIC | Age: 56
End: 2021-02-11
Payer: COMMERCIAL

## 2021-02-11 ENCOUNTER — OFFICE VISIT (OUTPATIENT)
Dept: FAMILY MEDICINE CLINIC | Facility: CLINIC | Age: 56
End: 2021-02-11
Payer: COMMERCIAL

## 2021-02-11 VITALS
SYSTOLIC BLOOD PRESSURE: 122 MMHG | HEIGHT: 70 IN | TEMPERATURE: 97.2 F | OXYGEN SATURATION: 97 % | RESPIRATION RATE: 16 BRPM | DIASTOLIC BLOOD PRESSURE: 80 MMHG | BODY MASS INDEX: 37.54 KG/M2 | HEART RATE: 78 BPM | WEIGHT: 262.2 LBS

## 2021-02-11 DIAGNOSIS — Z12.5 SCREENING FOR PROSTATE CANCER: ICD-10-CM

## 2021-02-11 DIAGNOSIS — R73.01 IMPAIRED FASTING GLUCOSE: ICD-10-CM

## 2021-02-11 DIAGNOSIS — E78.2 MIXED HYPERLIPIDEMIA: ICD-10-CM

## 2021-02-11 DIAGNOSIS — Z12.2 ENCOUNTER FOR SCREENING FOR LUNG CANCER: ICD-10-CM

## 2021-02-11 DIAGNOSIS — Z12.11 COLON CANCER SCREENING: ICD-10-CM

## 2021-02-11 DIAGNOSIS — Z00.00 HEALTH MAINTENANCE EXAMINATION: Primary | ICD-10-CM

## 2021-02-11 LAB
ALBUMIN SERPL BCP-MCNC: 4.3 G/DL (ref 3.5–5)
ALP SERPL-CCNC: 64 U/L (ref 46–116)
ALT SERPL W P-5'-P-CCNC: 38 U/L (ref 12–78)
ANION GAP SERPL CALCULATED.3IONS-SCNC: 2 MMOL/L (ref 4–13)
AST SERPL W P-5'-P-CCNC: 21 U/L (ref 5–45)
BILIRUB SERPL-MCNC: 1.22 MG/DL (ref 0.2–1)
BUN SERPL-MCNC: 14 MG/DL (ref 5–25)
CALCIUM SERPL-MCNC: 9.7 MG/DL (ref 8.3–10.1)
CHLORIDE SERPL-SCNC: 109 MMOL/L (ref 100–108)
CHOLEST SERPL-MCNC: 181 MG/DL (ref 50–200)
CO2 SERPL-SCNC: 28 MMOL/L (ref 21–32)
CREAT SERPL-MCNC: 1.08 MG/DL (ref 0.6–1.3)
EST. AVERAGE GLUCOSE BLD GHB EST-MCNC: 111 MG/DL
GFR SERPL CREATININE-BSD FRML MDRD: 76 ML/MIN/1.73SQ M
GLUCOSE P FAST SERPL-MCNC: 111 MG/DL (ref 65–99)
HBA1C MFR BLD: 5.5 %
HDLC SERPL-MCNC: 47 MG/DL
LDLC SERPL CALC-MCNC: 103 MG/DL (ref 0–100)
NONHDLC SERPL-MCNC: 134 MG/DL
POTASSIUM SERPL-SCNC: 4.5 MMOL/L (ref 3.5–5.3)
PROT SERPL-MCNC: 8 G/DL (ref 6.4–8.2)
PSA SERPL-MCNC: 1 NG/ML (ref 0–4)
SODIUM SERPL-SCNC: 139 MMOL/L (ref 136–145)
TRIGL SERPL-MCNC: 156 MG/DL

## 2021-02-11 PROCEDURE — 99396 PREV VISIT EST AGE 40-64: CPT | Performed by: FAMILY MEDICINE

## 2021-02-11 PROCEDURE — 80061 LIPID PANEL: CPT

## 2021-02-11 PROCEDURE — 80053 COMPREHEN METABOLIC PANEL: CPT

## 2021-02-11 PROCEDURE — 83036 HEMOGLOBIN GLYCOSYLATED A1C: CPT

## 2021-02-11 PROCEDURE — G0103 PSA SCREENING: HCPCS

## 2021-02-11 PROCEDURE — 36415 COLL VENOUS BLD VENIPUNCTURE: CPT

## 2021-02-11 NOTE — PROGRESS NOTES
Assessment/Plan:    No problem-specific Assessment & Plan notes found for this encounter  Diagnoses and all orders for this visit:    Health maintenance examination  Normal exam    Impaired fasting glucose  -     Comprehensive metabolic panel; Future  -     Hemoglobin A1C; Future    Mixed hyperlipidemia  -     Lipid panel; Future    Screening for prostate cancer  -     PSA, Total Screen; Future    Colon cancer screening  -     Ambulatory referral to Gastroenterology; Future    Encounter for screening for lung cancer  -     CT lung screening program; Future      Follow up in 1 year or as needed    Subjective:      Patient ID: Ricky Diehl is a 61 y o  male  Patient is here for a yearly check up  He has elevated glucose, pre diabetes in the past  He has been trying to follow a low carb diet  Also has noticed that when he eats red pepper flakes he gest small amount of blood in the stool  He has a history of hemorrhoid and few diverticula noted per colonoscopy done 8 years ago  He smoked for over 30 years and quit more than 10 years ago  The following portions of the patient's history were reviewed and updated as appropriate:   He  has a past medical history of Allergic (Almost life long sinus  Penicillin ), Hearing deficit, bilateral, HL (hearing loss) ([place] age), Lumbar herniated disc, Obesity (2006), and Psoriasis  He   Patient Active Problem List    Diagnosis Date Noted    Health maintenance examination 02/11/2021    Impaired fasting glucose 02/19/2020    Mixed hyperlipidemia 04/30/2019    ANNABEL (obstructive sleep apnea)     Class 2 severe obesity due to excess calories with serious comorbidity and body mass index (BMI) of 39 0 to 39 9 in adult [facility]) 01/14/2019    Allergic rhinitis due to pollen 05/10/2018    Psoriasis 05/10/2018    Screening for prostate cancer 05/10/2018     He  has a past surgical history that includes Hernia repair and Parotidectomy    His family history includes Heart murmur in his father; Hypertension in his mother; Psoriasis in his father, sister, and sister; Rheum arthritis in his mother; Stroke in his father; Thyroid disease in his sister  He  reports that he quit smoking about 18 years ago  His smoking use included cigarettes  He has a 20 00 pack-year smoking history  He has never used smokeless tobacco  He reports current alcohol use of about 12 0 standard drinks of alcohol per week  He reports that he does not use drugs  Current Outpatient Medications   Medication Sig Dispense Refill    montelukast (SINGULAIR) 10 mg tablet Take 1 tablet (10 mg total) by mouth daily at bedtime 90 tablet 2     No current facility-administered medications for this visit  Current Outpatient Medications on File Prior to Visit   Medication Sig    montelukast (SINGULAIR) 10 mg tablet Take 1 tablet (10 mg total) by mouth daily at bedtime     No current facility-administered medications on file prior to visit  He is allergic to penicillins       Review of Systems   Constitutional: Negative for activity change, appetite change, fatigue and fever  HENT: Negative for congestion and ear discharge  Respiratory: Negative for cough and shortness of breath  Cardiovascular: Negative for chest pain and palpitations  Gastrointestinal: Negative for diarrhea and nausea  Musculoskeletal: Negative for arthralgias and back pain  Skin: Negative for color change and rash  Neurological: Negative for dizziness and headaches  Psychiatric/Behavioral: Negative for agitation and behavioral problems  Objective:      /80 (BP Location: Left arm, Patient Position: Sitting, Cuff Size: Adult)   Pulse 78   Temp (!) 97 2 °F (36 2 °C)   Resp 16   Ht 5' 10" (1 778 m)   Wt 119 kg (262 lb 3 2 oz)   SpO2 97%   BMI 37 62 kg/m²          Physical Exam  Constitutional:       General: He is not in acute distress  Appearance: He is well-developed  He is not diaphoretic     Eyes: General: No scleral icterus  Pupils: Pupils are equal, round, and reactive to light  Cardiovascular:      Rate and Rhythm: Normal rate and regular rhythm  Heart sounds: Normal heart sounds  No murmur  Pulmonary:      Effort: Pulmonary effort is normal  No respiratory distress  Breath sounds: Normal breath sounds  No wheezing  Abdominal:      General: Bowel sounds are normal  There is no distension  Palpations: Abdomen is soft  Tenderness: There is no abdominal tenderness  Skin:     General: Skin is warm and dry  Findings: No rash  Neurological:      Mental Status: He is alert and oriented to person, place, and time

## 2021-02-11 NOTE — PROGRESS NOTES
BMI Counseling: Body mass index is 37 62 kg/m²  The BMI is above normal  Nutrition recommendations include reducing portion sizes and decreasing overall calorie intake  Exercise recommendations include moderate aerobic physical activity for 150 minutes/week

## 2021-02-12 DIAGNOSIS — J30.1 ALLERGIC RHINITIS DUE TO POLLEN, UNSPECIFIED SEASONALITY: ICD-10-CM

## 2021-02-12 DIAGNOSIS — R17 ELEVATED BILIRUBIN: Primary | ICD-10-CM

## 2021-02-12 RX ORDER — MONTELUKAST SODIUM 10 MG/1
10 TABLET ORAL
Qty: 90 TABLET | Refills: 3 | Status: SHIPPED | OUTPATIENT
Start: 2021-02-12 | End: 2022-01-17

## 2021-09-14 PROBLEM — L74.52 FOCAL HYPERHIDROSIS DUE TO FREY SYNDROME: Status: ACTIVE | Noted: 2021-09-14

## 2021-11-26 PROBLEM — H91.93 BILATERAL HEARING LOSS: Status: ACTIVE | Noted: 2021-11-26

## 2021-12-05 ENCOUNTER — APPOINTMENT (OUTPATIENT)
Dept: RADIOLOGY | Facility: CLINIC | Age: 56
End: 2021-12-05
Payer: COMMERCIAL

## 2021-12-05 ENCOUNTER — OFFICE VISIT (OUTPATIENT)
Dept: URGENT CARE | Facility: CLINIC | Age: 56
End: 2021-12-05
Payer: COMMERCIAL

## 2021-12-05 VITALS
TEMPERATURE: 98 F | HEIGHT: 63 IN | RESPIRATION RATE: 18 BRPM | BODY MASS INDEX: 43.05 KG/M2 | HEART RATE: 71 BPM | WEIGHT: 243 LBS | OXYGEN SATURATION: 97 %

## 2021-12-05 DIAGNOSIS — R06.02 SHORTNESS OF BREATH: Primary | ICD-10-CM

## 2021-12-05 DIAGNOSIS — R06.02 SHORTNESS OF BREATH: ICD-10-CM

## 2021-12-05 PROCEDURE — 71046 X-RAY EXAM CHEST 2 VIEWS: CPT

## 2021-12-05 PROCEDURE — 99213 OFFICE O/P EST LOW 20 MIN: CPT | Performed by: PHYSICIAN ASSISTANT

## 2021-12-05 RX ORDER — DOXYCYCLINE HYCLATE 100 MG/1
100 CAPSULE ORAL EVERY 12 HOURS SCHEDULED
Qty: 14 CAPSULE | Refills: 0 | Status: SHIPPED | OUTPATIENT
Start: 2021-12-05 | End: 2021-12-12

## 2021-12-05 RX ORDER — ALBUTEROL SULFATE 90 UG/1
2 AEROSOL, METERED RESPIRATORY (INHALATION) EVERY 6 HOURS PRN
Qty: 8 G | Refills: 0 | Status: SHIPPED | OUTPATIENT
Start: 2021-12-05 | End: 2022-05-16

## 2021-12-07 ENCOUNTER — TELEPHONE (OUTPATIENT)
Dept: FAMILY MEDICINE CLINIC | Facility: CLINIC | Age: 56
End: 2021-12-07

## 2021-12-08 DIAGNOSIS — R93.89 ABNORMAL CHEST X-RAY: Primary | ICD-10-CM

## 2021-12-08 DIAGNOSIS — R91.1 PULMONARY NODULE: ICD-10-CM

## 2021-12-12 ENCOUNTER — HOSPITAL ENCOUNTER (OUTPATIENT)
Dept: CT IMAGING | Facility: HOSPITAL | Age: 56
Discharge: HOME | End: 2021-12-12
Attending: FAMILY MEDICINE
Payer: COMMERCIAL

## 2021-12-12 DIAGNOSIS — R91.1 PULMONARY NODULE: ICD-10-CM

## 2021-12-12 DIAGNOSIS — R93.89 ABNORMAL CHEST X-RAY: ICD-10-CM

## 2021-12-12 PROCEDURE — G1004 CDSM NDSC: HCPCS

## 2021-12-12 PROCEDURE — 71250 CT THORAX DX C-: CPT

## 2021-12-13 DIAGNOSIS — R91.1 PULMONARY NODULE: Primary | ICD-10-CM

## 2021-12-13 DIAGNOSIS — R93.89 ABNORMAL CT SCAN, CHEST: ICD-10-CM

## 2021-12-23 ENCOUNTER — HOSPITAL ENCOUNTER (OUTPATIENT)
Dept: RADIOLOGY | Age: 56
Discharge: HOME | End: 2021-12-23
Payer: COMMERCIAL

## 2021-12-23 DIAGNOSIS — R91.1 PULMONARY NODULE: ICD-10-CM

## 2021-12-23 DIAGNOSIS — R93.89 ABNORMAL CT SCAN, CHEST: ICD-10-CM

## 2021-12-23 PROCEDURE — A9587 GALLIUM GA-68: HCPCS

## 2021-12-23 PROCEDURE — 78815 PET IMAGE W/CT SKULL-THIGH: CPT

## 2021-12-23 PROCEDURE — G1004 CDSM NDSC: HCPCS

## 2021-12-29 ENCOUNTER — TELEMEDICINE (OUTPATIENT)
Dept: FAMILY MEDICINE CLINIC | Facility: CLINIC | Age: 56
End: 2021-12-29
Payer: COMMERCIAL

## 2021-12-29 DIAGNOSIS — R93.89 ABNORMAL CT SCAN, CHEST: Primary | ICD-10-CM

## 2021-12-29 DIAGNOSIS — R94.8 ABNORMAL POSITRON EMISSION TOMOGRAPHY (PET) SCAN: ICD-10-CM

## 2021-12-29 PROCEDURE — 99214 OFFICE O/P EST MOD 30 MIN: CPT | Performed by: PHYSICIAN ASSISTANT

## 2021-12-30 ENCOUNTER — APPOINTMENT (OUTPATIENT)
Dept: LAB | Facility: CLINIC | Age: 56
End: 2021-12-30
Payer: COMMERCIAL

## 2021-12-30 DIAGNOSIS — R94.8 ABNORMAL POSITRON EMISSION TOMOGRAPHY (PET) SCAN: ICD-10-CM

## 2021-12-30 LAB — PSA SERPL-MCNC: 1.1 NG/ML (ref 0–4)

## 2021-12-30 PROCEDURE — 84153 ASSAY OF PSA TOTAL: CPT

## 2022-01-04 ENCOUNTER — TELEPHONE (OUTPATIENT)
Dept: UROLOGY | Facility: AMBULATORY SURGERY CENTER | Age: 57
End: 2022-01-04

## 2022-01-04 NOTE — TELEPHONE ENCOUNTER
Complaint/Diagnosis:Lymphadenopathy Retroperitoneal    Insurance:[employer]    History of Cancer:no    Previous urologist:no    Outside testing/where:epic    If yes,what kind:epic    Records requested/where:[facility]    Preferred location:[place]

## 2022-01-05 NOTE — TELEPHONE ENCOUNTER
Returned call to patient  Patient states that he spoke with his pcp and would not like to make any appointments with urology at this time   He will call back to our office to schedule an appt

## 2022-01-17 DIAGNOSIS — J30.1 ALLERGIC RHINITIS DUE TO POLLEN, UNSPECIFIED SEASONALITY: ICD-10-CM

## 2022-01-17 RX ORDER — MONTELUKAST SODIUM 10 MG/1
TABLET ORAL
Qty: 90 TABLET | Refills: 3 | Status: SHIPPED | OUTPATIENT
Start: 2022-01-17 | End: 2022-05-16 | Stop reason: SDUPTHER

## 2022-03-30 ENCOUNTER — HOSPITAL ENCOUNTER (OUTPATIENT)
Dept: CT IMAGING | Facility: HOSPITAL | Age: 57
Discharge: HOME | End: 2022-03-30
Attending: FAMILY MEDICINE
Payer: COMMERCIAL

## 2022-03-30 DIAGNOSIS — R91.1 PULMONARY NODULE: ICD-10-CM

## 2022-03-30 DIAGNOSIS — R93.89 ABNORMAL CT SCAN, CHEST: ICD-10-CM

## 2022-03-30 PROCEDURE — G1004 CDSM NDSC: HCPCS

## 2022-03-30 PROCEDURE — 71260 CT THORAX DX C+: CPT

## 2022-03-30 RX ADMIN — IOHEXOL 85 ML: 350 INJECTION, SOLUTION INTRAVENOUS at 07:45

## 2022-04-04 ENCOUNTER — TELEPHONE (OUTPATIENT)
Dept: CARDIAC SURGERY | Facility: CLINIC | Age: 57
End: 2022-04-04

## 2022-04-05 ENCOUNTER — TELEPHONE (OUTPATIENT)
Dept: GYNECOLOGIC ONCOLOGY | Facility: CLINIC | Age: 57
End: 2022-04-05

## 2022-04-05 NOTE — TELEPHONE ENCOUNTER
Thoracic SX Intake Form   Patient Details   Saint Bumps     1965     Reason For Appointment   Who is Calling? patient   If not patient, Name? Who is the Referring Doctor? Gianna Hall   Which surgeon is Patient referred to? [id number]   What is the diagnosis? Right Nodule   Has this diagnosis been confirmed by    imaging/ biopsy/surgery? If yes, what is the date it was done? yes   Biopsy done at [facility]? If not, where was it done? No   Was imaging done, and was it done at [address] [place] [address]? If not, where was it done? Yes    Scheduling Information     What is the best call back number?   [phone](2SL to 10 am best time to Reach )   Miscellaneous Information

## 2022-04-21 ENCOUNTER — APPOINTMENT (OUTPATIENT)
Dept: RADIOLOGY | Facility: OTHER | Age: 57
End: 2022-04-21
Payer: COMMERCIAL

## 2022-04-21 ENCOUNTER — OFFICE VISIT (OUTPATIENT)
Dept: OBGYN CLINIC | Facility: OTHER | Age: 57
End: 2022-04-21
Payer: COMMERCIAL

## 2022-04-21 VITALS
DIASTOLIC BLOOD PRESSURE: 72 MMHG | HEART RATE: 67 BPM | WEIGHT: 239 LBS | BODY MASS INDEX: 42.35 KG/M2 | HEIGHT: 63 IN | SYSTOLIC BLOOD PRESSURE: 115 MMHG

## 2022-04-21 DIAGNOSIS — M75.32 CALCIFIC TENDONITIS OF LEFT SHOULDER: Primary | ICD-10-CM

## 2022-04-21 DIAGNOSIS — M25.512 LEFT SHOULDER PAIN, UNSPECIFIED CHRONICITY: ICD-10-CM

## 2022-04-21 DIAGNOSIS — M75.41 SUBACROMIAL IMPINGEMENT OF RIGHT SHOULDER: ICD-10-CM

## 2022-04-21 DIAGNOSIS — M25.511 RIGHT SHOULDER PAIN, UNSPECIFIED CHRONICITY: ICD-10-CM

## 2022-04-21 PROCEDURE — 73030 X-RAY EXAM OF SHOULDER: CPT

## 2022-04-21 PROCEDURE — 3008F BODY MASS INDEX DOCD: CPT | Performed by: ORTHOPAEDIC SURGERY

## 2022-04-21 PROCEDURE — 99204 OFFICE O/P NEW MOD 45 MIN: CPT | Performed by: ORTHOPAEDIC SURGERY

## 2022-04-21 NOTE — PROGRESS NOTES
Assessment  Diagnoses and all orders for this visit:    Calcific tendonitis of left shoulder    Subacromial impingement of right shoulder    Discussion and Plan:    · The patient has an examination consistent with calcific tendonitis of the left shoulder and subacromial impingement syndrome of the right shoulder  I have discussed with the patient the pathophysiology of this diagnosis and reviewed how the examination correlates with this diagnosis  Treatment options were discussed at length and after discussing these treatment options, the patient declined a cortisone injection at this time but wished to have a referral to physical therapy  · We will reevaluate the patient in 6-8 weeks  If the symptoms fail to improve with this treatment the patient would be indicated for further imaging in the form of an MRI scan of the left shoulder  Subjective:   Patient ID: Hans Berrios is a 61 y o  male    The patient presents with a chief complaint of bilateral, left worse than right shoulder pain  The pain began a few year(s) ago and is not associated with an acute injury  Patient reports an injury about 15-20 years ago after he was shutting a window on a shy and had to brake suddenly, pulling his left shoulder  No acute injury to the right shoulder  The patient describes the pain as aching and dull in intensity,  intermittent in timing, and localizes the pain to the  bilateral subacromial joint, deltoid  The pain is worse with overhead work, overuse and raising arm over head and relieved by rest, ice, avoiding the painful activities  The pain is not associated with numbness and tingling  The pain is not associated with constitutional symptoms  The patient is not awoken at night by the pain  The patient has had no prior treatment        The following portions of the patient's history were reviewed and updated as appropriate: allergies, current medications, past family history, past medical history, past social history, past surgical history and problem list     Objective:  /72   Pulse 67   Ht 5' 3" (1 6 m)   Wt 108 kg (239 lb)   BMI 42 34 kg/m²       Right Shoulder Exam     Tenderness   The patient is experiencing no tenderness  Range of Motion   The patient has normal right shoulder ROM  Muscle Strength   Abduction: 5/5     Tests   Louis test: positive    Other   Erythema: absent  Sensation: normal  Pulse: present      Left Shoulder Exam     Tenderness   The patient is experiencing no tenderness  Range of Motion   The patient has normal left shoulder ROM  Muscle Strength   Abduction: 5/5     Tests   Louis test: positive  Impingement: positive    Other   Erythema: absent  Sensation: normal  Pulse: present             Physical Exam  Constitutional:       General: He is not in acute distress  Appearance: He is well-developed  Eyes:      Conjunctiva/sclera: Conjunctivae normal       Pupils: Pupils are equal, round, and reactive to light  Cardiovascular:      Rate and Rhythm: Normal rate and regular rhythm  Pulmonary:      Effort: Pulmonary effort is normal       Breath sounds: Normal breath sounds  Abdominal:      General: Bowel sounds are normal       Palpations: Abdomen is soft  Musculoskeletal:      Cervical back: Normal range of motion and neck supple  Skin:     General: Skin is warm and dry  Findings: No erythema or rash  Neurological:      Mental Status: He is alert and oriented to person, place, and time  Deep Tendon Reflexes: Reflexes are normal and symmetric  Psychiatric:         Behavior: Behavior normal        I have personally reviewed pertinent films in PACS and my interpretation is as follows  X Ray Left Shoulder 4/21/2022: Small calcific deposit noted consistent with calcific tendonitis  No other acute osseous abnormalities or degenerative changes    X Ray Right Shoulder 4/21/2022: No acute osseous abnormalities or degenerative changes  Scribe Attestation    I,:  Fili Mcgrath am acting as a scribe while in the presence of the attending physician :       I,:  Wilfredo Martinez MD personally performed the services described in this documentation    as scribed in my presence :

## 2022-04-29 ENCOUNTER — EVALUATION (OUTPATIENT)
Dept: PHYSICAL THERAPY | Facility: CLINIC | Age: 57
End: 2022-04-29
Payer: COMMERCIAL

## 2022-04-29 DIAGNOSIS — M75.32 CALCIFIC TENDONITIS OF LEFT SHOULDER: ICD-10-CM

## 2022-04-29 DIAGNOSIS — M75.41 SUBACROMIAL IMPINGEMENT OF RIGHT SHOULDER: ICD-10-CM

## 2022-04-29 PROCEDURE — 97110 THERAPEUTIC EXERCISES: CPT

## 2022-04-29 PROCEDURE — 97162 PT EVAL MOD COMPLEX 30 MIN: CPT

## 2022-04-29 NOTE — PROGRESS NOTES
PT Evaluation     Today's date: 2022  Patient name: Courtney Galvan  : 1965  MRN: [id number]  Referring provider: Conception Brocks  Dx:   Encounter Diagnosis     ICD-10-CM    1  Subacromial impingement of right shoulder  M75 41 Ambulatory Referral to Physical Therapy   2  Calcific tendonitis of left shoulder  M75 32 Ambulatory Referral to Physical Therapy                  Assessment  Assessment details: Courtney Galvan is a 61 y o  male who presents with signs and symptoms consistent of referring diagnosis  Patient presents with pain, decreased strength, decreased ROM, decreased joint mobility and postural dysfunction  Due to these impairments, Patient has difficulty performing a/iadls, recreational activities, work-related activities and engaging in social activities  Patient is more symptomatic in L>R UE this date Of note, patient is most limited with pain during functional movments which has led to decreased activity tolerance  Most notably patient is weak in pure abduction and ER B/L but moreso in the L>R  Patient presents with signs and sxs of impingement of the [address] joint with positive provacative testing and weakness  Patient does show almost symmetrical ROM B/L although more pain noted during LUE AROM  Patient would benefit from a comprehensive HEP focusing on improving motor control, ROM, strength, and functional mobility  Patient would benefit from skilled physical therapy to address the impairments, improve their level of function, and to improve their overall quality of life  PT POC 1x/wk every 2 weeks for 8 weeks due to high co pay   Thank you for this referral      Impairments: abnormal or restricted ROM, activity intolerance, impaired physical strength, lacks appropriate home exercise program, pain with function, poor posture  and poor body mechanics    Symptom irritability: moderateBarriers to therapy: High co pay   Understanding of Dx/Px/POC: good   Prognosis: good    Goals  Short Term Goals: to be achieved by 4 weeks  1) Patient to be independent with basic HEP  2) Decrease pain to 3/10 at its worst   3) Increase shoulder ROM by 5-10 degrees in all planes  4) Increase shoulder strength by 1/2 MMT grade in all deficient planes  Long Term Goals: to be achieved by discharge  1) FOTO equal to or greater than 55   2) Patient to be independent with comprehensive HEP  3) Patient will demonstrate maximal over head reaching  4) Increase UE strength to 5/5 MMT grade in all planes to improve a/iadls  5) Patient to report no sleep interruption secondary to pain  Plan  Plan details: PT POC 1x/wk every 2 weeks for 8 weeks  Patient would benefit from: skilled physical therapy  Planned modality interventions: cryotherapy, manual electrical stimulation and thermotherapy: hydrocollator packs  Planned therapy interventions: activity modification, body mechanics training, flexibility, functional ROM exercises, home exercise program, therapeutic exercise, therapeutic activities, stretching, strengthening, patient education, neuromuscular re-education, manual therapy, joint mobilization and massage  Frequency: 2x week  Duration in weeks: 8  Plan of Care beginning date: 4/29/2022  Plan of Care expiration date: 6/24/2022  Treatment plan discussed with: patient        Subjective Evaluation    History of Present Illness  Mechanism of injury: History of Current Injury: Patient reports that he has had chronic shoulder pain for some time now  About 6 months ago patient started to refinish his basement which started to aggravate his shoulder quite a bit  Patient reports that his shoulder was so painful that he was unable to sleep on his side  Patient notes that his L shoulder is more bothersome than his R  Pain location/Descriptors: Both shoulders anteriorly and on the top     Aggravating factors: Sleeping, overhead lifting, open doors   Easing factors: Rest   Imaging: X-rays   Special Questions: Sendy Townsend denies a new onset of Dysphagia, Dysarthria and Diplopia  Patient goals:  Get out of PT  Hobbies/Interest: Auto mechanics, yard work   Occupation:     Quality of life: good    Pain  Current pain ratin  At best pain ratin  At worst pain ratin  Quality: burning and sharp    Patient Goals  Patient goals for therapy: decreased pain, return to sport/leisure activities, independence with ADLs/IADLs, increased strength and increased motion          Objective     Active Range of Motion   Left Shoulder   Flexion: 150 degrees   Extension: 140 degrees with pain  External rotation BTH: C7   Internal rotation BTB: L2 with pain    Right Shoulder   Flexion: 160 degrees   Extension: 160 degrees   External rotation BTH: T2   Internal rotation BTB: T10     Strength/Myotome Testing     Left Shoulder     Planes of Motion   Flexion: 4   Abduction: 4+   External rotation at 0°: 4-   Internal rotation at 0°: 5     Isolated Muscles   Lower trapezius: 4   Middle deltoid: 4   Upper trapezius: 4     Right Shoulder     Planes of Motion   Flexion: 4   Abduction: 4   External rotation at 0°: 4   Internal rotation at 0°: 5     Isolated Muscles   Lower trapezius: 4   Middle deltoid: 4   Upper trapezius: 4     Tests     Left Shoulder   Positive empty can, Hawkin's, Neer's and Speed's  Negative scapular assistance  and bicep load   Right Shoulder   Positive empty can and Speed's  Negative Hawkin's and bicep load test positive                Diagnosis: B/L Shoulder pain    Precautions: NO TAPE; 1x every 2 weeks progress HEP    POC Expires:    Re-evaluation Date:     FOTO Scores/Date: 54 ()   Visit Count        Manuals        [facility] Mobs        [facility] PROM                                Ther Ex        UBE        Pulleys        Sidelying ER        Scap retractions        Doorway stretch        Thoracic Extensions        TB Rows         TB Pulldowns                 Rhomboid stretch        IR strap stretch        Prone Y, T, I        Neuro Re-Ed        Prone retractions        Serratus Punch                                                                                                       Ther Act                                         Modalities

## 2022-05-03 ENCOUNTER — OFFICE VISIT (OUTPATIENT)
Dept: CARDIAC SURGERY | Facility: CLINIC | Age: 57
End: 2022-05-03
Payer: COMMERCIAL

## 2022-05-03 ENCOUNTER — DOCUMENTATION (OUTPATIENT)
Dept: OTHER | Facility: HOSPITAL | Age: 57
End: 2022-05-03

## 2022-05-03 ENCOUNTER — DOCUMENTATION (OUTPATIENT)
Dept: CARDIAC SURGERY | Facility: CLINIC | Age: 57
End: 2022-05-03

## 2022-05-03 VITALS
HEART RATE: 51 BPM | SYSTOLIC BLOOD PRESSURE: 120 MMHG | WEIGHT: 238.1 LBS | BODY MASS INDEX: 42.19 KG/M2 | RESPIRATION RATE: 16 BRPM | OXYGEN SATURATION: 99 % | TEMPERATURE: 97.2 F | HEIGHT: 63 IN | DIASTOLIC BLOOD PRESSURE: 78 MMHG

## 2022-05-03 DIAGNOSIS — R91.1 PULMONARY NODULE: ICD-10-CM

## 2022-05-03 DIAGNOSIS — R91.1 LUNG NODULE: Primary | ICD-10-CM

## 2022-05-03 DIAGNOSIS — R93.89 ABNORMAL CT SCAN, CHEST: ICD-10-CM

## 2022-05-03 PROCEDURE — 99205 OFFICE O/P NEW HI 60 MIN: CPT | Performed by: THORACIC SURGERY (CARDIOTHORACIC VASCULAR SURGERY)

## 2022-05-03 NOTE — PROGRESS NOTES
I met with Mari Pisano and his wife at his appointment with Dr Canchola Postal  I introduced myself and explained my role to him  Questions answered, support provided  He knows to call the office with any future questions or concerns

## 2022-05-03 NOTE — ASSESSMENT & PLAN NOTE
We had a discussion with Mr Eliz Melara after personally reviewing his imaging and medical history  He has a right lower lobe nodule, originally seen in December 2021, which has been essentially stable since then  It has smooth borders, which favors carcinoid tumors  Dr Ruby Reynolds is recommending a flexible bronchoscopy, navigational bronchoscopy, EBUS by Dr Ivet Morales for tissue diagnosis  We will discuss this with him  The procedure, possible risks, and post operative course were explained and all questions were answered  He will undergo an EKG prior to the procedure  He is in agreement with the plan

## 2022-05-03 NOTE — PROGRESS NOTES
Documentation of Informed Consent Process for Clinical Research Study    Study Title: SEER  Patient Name: Jessica Weems  YOB: 1965    This signed and dated document shall serve as certification that all of the below listed required elements of informed consent were provided to the subject or legally authorized representative signing the actual Informed Consent Document, both in written and verbal format  The HIPAA consent is contained within the Informed Consent document, and has also been discussed  A copy of the actual signed and dated Informed Consent has also been provided to the subject or legally authorized representative, and the original signed document shall be maintained in the research shadow chart  Element of Informed Consent Discussed Date Initials/ Person obtaining consent   A statement that the study involves research, an explanation of the purposes of the research and the expected duration of the subject's participation, a description of the procedures to be followed, and identification of any procedures which are experimental 5/3/22 HS   A description of any reasonably foreseeable risks or discomforts to the subject  5/3/22 HS   A description of any benefits to the subject or to others which may reasonably be expected from the research  5/3/22 HS   A disclosure of appropriate alternative procedures or courses of treatment, if any, that might be advantageous to the subject  5/3/22 HS   A statement describing the extent, if any, to which confidentiality of records identifying the subject will be maintained and that notes the possibility that the Food and Drug Administration may inspect the records 5/3/22 HS   For research involving more than minimal risk, an explanation as to whether any compensation and an explanation as to whether any medical treatments are available if injury occurs and, if so, what they consist of, or where further information may be obtained   5/3/22 HS An explanation of whom to contact for answers to pertinent questions about the research and research subjects' rights, and whom to contact in the event of a research-related injury to the subject  5/3/22    A statement that participation is voluntary, that refusal to participate will involve no penalty or loss of benefits to which the subject is otherwise entitled, and that the subject may discontinue participation at any time without penalty or loss of benefits to which the subject is otherwise entitled  5/3/22    A statement that the particular treatment or procedure may involve risks to the subject (or to the embryo or fetus, if the subject is or may become pregnant) which are currently unforeseeable 5/3/22    Anticipated circumstances under which the subject's participation may be terminated by the investigator without regard to the subject's consent  5/3/22    Any additional costs to the subject that may result from participation in the research 5/3/22    The consequences of a subjects' decision to withdraw from the research and procedures for orderly termination of participation by the subject  5/3/22    A statement that significant new findings developed during the course of the research which may relate to the subject's willingness to continue participation will be provided to the subject  5/3/22    The approximate number of subjects involved in the study   5/3/22 HS

## 2022-05-03 NOTE — PROGRESS NOTES
Thoracic Consult  Assessment/Plan:    Lung nodule  We had a discussion with Mr Eliz Melara after personally reviewing his imaging and medical history  He has a right lower lobe nodule, originally seen in December 2021, which has been essentially stable since then  It has smooth borders, which favors carcinoid tumors  Dr Ruby Reynolds is recommending a flexible bronchoscopy, navigational bronchoscopy, EBUS by Dr Ivet Morales for tissue diagnosis  We will discuss this with him  The procedure, possible risks, and post operative course were explained and all questions were answered  He will undergo an EKG prior to the procedure  He is in agreement with the plan  Diagnoses and all orders for this visit:    Lung nodule    Abnormal CT scan, chest  -     Ambulatory Referral to Thoracic Surgery    Pulmonary nodule  -     Ambulatory Referral to Thoracic Surgery  -     Case request operating room: BRONCHOSCOPY NAVIGATIONAL, ENDOBRONCHIAL ULTRASOUND (EBUS); Standing  -     EKG 12 lead; Future  -     Case request operating room: BRONCHOSCOPY NAVIGATIONAL, ENDOBRONCHIAL ULTRASOUND (EBUS)             Thoracic History   Diagnosis: Right lower lobe lung nodule    Procedures/Surgeries:    Pathology:    Adjuvant Therapy:           Patient ID: Minesh Perez is a 61 y o  male  ECOG 0    HPI    Mr Eliz Melara is a 60 yo gentleman with a history of seasonal allergies, hearing loss, and psoriasis who was referred by Dr Renae Larson for a right lower lobe lung nodule  He originally was seen in a walk in clinic which first revealed a right lung nodule  He then underwent a CT of the chest on 12/12/21, which revealed 2 0 x 1 3 cm right lower lobe lung nodule  PET from 12/23/21 revealed a SUV of 1 6 and measuring 1 9 x 1 4 cm  CT scan from 3/30/22 revealed a stable 1 9 x 1 4 cm smoothly marginated solid right lower lobe nodule since 12/21  He has had no prior chest surgeries  He was a previous 20 ppy smoker, quitting in 2003   He drink about 12 alcoholic drinks on the weekends, ocasionally a beer with dinner during dinner  He is not on any blood thinners  He denies fever, chills, cough, previous cancers, shortness of breath, or hemoptysis  He lost 60 lbs over the past 3 years  He has not been COVID vaccinated  He drives a bus for a living  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, 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  The following portions of the patient's history were reviewed and updated as appropriate: allergies, current medications, past family history, past medical history, past social history, past surgical history and problem list     Past Medical History:   Diagnosis Date    Allergic Almost life long sinus  Penicillin   Hearing deficit, bilateral     HL (hearing loss) Young age    Hearing aids worn full time    Lumbar herniated disc     S1-L5/L5-L4    Lung nodule     Obesity     Psoriasis       Past Surgical History:   Procedure Laterality Date    HERNIA REPAIR      PAROTIDECTOMY        Family History   Problem Relation Age of Onset    Hypertension Mother     Rheum arthritis Mother     Stroke Father         Heart murmur    Heart murmur Father     Psoriasis Father     Thyroid disease Sister     Psoriasis Sister     Psoriasis Sister       Social History     Socioeconomic History    Marital status: /Civil Union     Spouse name: Not on file    Number of children: Not on file    Years of education: Not on file    Highest education level: Not on file   Occupational History    Not on file   Tobacco Use    Smoking status: Former Smoker     Packs/day: 1 00     Years: 20 00     Pack years: 20 00     Types: Cigarettes     Quit date:      Years since quittin 3    Smokeless tobacco: Never Used    Tobacco comment: Quit in    Vaping Use    Vaping Use: Not on file   Substance and Sexual Activity    Alcohol use:  Yes     Alcohol/week: 12 0 standard drinks     Types: 8 Cans of beer, 4 Standard drinks or equivalent per week Comment: "ONLY ON WEEKENDS"    Drug use: No    Sexual activity: Yes     Partners: Female   Other Topics Concern    Not on file   Social History Narrative    Not on file     Social Determinants of Health     Financial Resource Strain: Not on file   Food Insecurity: Not on file   Transportation Needs: Not on file   Physical Activity: Not on file   Stress: Not on file   Social Connections: Not on file   Intimate Partner Violence: Not on file   Housing Stability: Not on file        Allergies   Allergen Reactions    Penicillins Rash     Current Outpatient Medications on File Prior to Visit   Medication Sig Dispense Refill    montelukast (SINGULAIR) 10 mg tablet TAKE 1 TABLET DAILY AT BEDTIME 90 tablet 3    albuterol (PROVENTIL HFA,VENTOLIN HFA) 90 mcg/act inhaler Inhale 2 puffs every 6 (six) hours as needed for wheezing 8 g 0     No current facility-administered medications on file prior to visit  Review of Systems   Constitutional: Negative for activity change, appetite change, chills, fatigue, fever and unexpected weight change  HENT: Positive for hearing loss  Negative for postnasal drip, sore throat, trouble swallowing and voice change  Eyes: Negative for visual disturbance  Respiratory: Negative for cough, chest tightness, shortness of breath and wheezing  Cardiovascular: Negative for chest pain and leg swelling  Gastrointestinal: Negative for abdominal pain, constipation, diarrhea, nausea and vomiting  Musculoskeletal: Negative for arthralgias and back pain  Skin: Negative for color change  Neurological: Negative for dizziness, syncope, light-headedness and headaches  Hematological: Negative for adenopathy  Psychiatric/Behavioral: Negative for agitation, behavioral problems and confusion  All other systems reviewed and are negative  Objective:   Physical Exam  Vitals reviewed  Constitutional:       General: He is not in acute distress  Appearance: Normal appearance  He is well-developed  He is not ill-appearing or diaphoretic  HENT:      Head: Normocephalic and atraumatic  Ears:      Comments: B/l hearing aids  Mouth/Throat:      Comments: Masked   Eyes:      General: No scleral icterus  Extraocular Movements: Extraocular movements intact  Neck:      Trachea: No tracheal deviation  Comments: Right neck scar well healed  Cardiovascular:      Rate and Rhythm: Normal rate and regular rhythm  Pulses: Normal pulses  Heart sounds: Normal heart sounds  No murmur heard  Pulmonary:      Effort: Pulmonary effort is normal  No respiratory distress  Breath sounds: Normal breath sounds  No wheezing  Abdominal:      General: Bowel sounds are normal  There is no distension  Palpations: Abdomen is soft  Musculoskeletal:         General: Normal range of motion  Cervical back: Normal range of motion and neck supple  Right lower leg: No edema  Left lower leg: No edema  Lymphadenopathy:      Cervical: No cervical adenopathy  Skin:     General: Skin is warm and dry  Neurological:      Mental Status: He is alert and oriented to person, place, and time  Cranial Nerves: No cranial nerve deficit  Psychiatric:         Mood and Affect: Mood normal          Behavior: Behavior normal          Thought Content: Thought content normal      /78 (BP Location: Left arm, Patient Position: Sitting, Cuff Size: Standard)   Pulse (!) 51   Temp (!) 97 2 °F (36 2 °C) (Temporal)   Resp 16   Ht 5' 3" (1 6 m)   Wt 108 kg (238 lb 1 6 oz)   SpO2 99%   BMI 42 18 kg/m²          CT chest w contrast    Result Date: 4/2/2022  Narrative CT CHEST WITH IV CONTRAST INDICATION:   R91 1: Solitary pulmonary nodule R93 89: Abnormal findings on diagnostic imaging of other specified body structures  COMPARISON:  Chest CT from 12/12/2021 and PET/CT from 12/23/2021   TECHNIQUE: Chest CT with intravenous contrast   Axial, sagittal, coronal 2D reformats and coronal MIPS from source data  Radiation dose length product (DLP):  330 mGy-cm   Radiation dose exposure minimized using iterative reconstruction and automated exposure control  IV Contrast:  85 mL of iohexol (OMNIPAQUE) FINDINGS: LUNGS:  Stable 1 9 x 1 4 cm smoothly marginated solid right lower lobe nodule since December 2021  AIRWAYS: No significant filling defects  PLEURA:  Unremarkable  HEART/GREAT VESSELS:  Normal for age  MEDIASTINUM AND WILLIAMS:  Unremarkable  CHEST WALL AND LOWER NECK: Unremarkable  UPPER ABDOMEN:  Unremarkable  OSSEOUS STRUCTURES: Mild degenerative disease in the spine  Impression Stable 1 9 x 1 4 cm smoothly marginated right lower lobe nodule since December 2021  This could be a carcinoid tumor or benign hamartoma despite the absence of fat  Workstation performed: [id number]       NM PET CT skull base to mid thigh    Result Date: 12/27/2021  Narrative NETSPOT PET/CT SCAN  INDICATION:  Abnormal CT  Evaluate for carcinoid tumor  R91 1: Solitary pulmonary nodule R93 89: Abnormal findings on diagnostic imaging of other specified body structures  MODIFIER: PI      COMPARISON:  CT chest 12/12/2021  CELL TYPE:  None  TECHNIQUE:   5 3 mCi Gallium-68 Dotatate Netspot administered IV  Multiplanar attenuation corrected and non-attenuation corrected PET images were acquired 60 minutes post injection  Contiguous, low dose, axial CT sections were obtained from the vertex through the femurs for anatomic localization  Intravenous contrast was not utilized  FINDINGS:   BRAIN:    Normal pituitary gland uptake is demonstrated  No acute abnormalities are seen  HEAD/NECK:  There is a physiologic distribution of the radiotracer  Normal salivary gland and thyroid uptake is demonstrated  CT images:  Unremarkable  CHEST:   Minimal radiotracer uptake related to the smoothly marginated right lower lobe nodule  The SUV max here is 1 6    Nodule measures 1 9 x 1 4 cm in size image 137 series 3, stable previously 2 0 x 1 3 cm  CT images: Previously noted patchy ground glass infiltrates bilaterally have mostly cleared  ABDOMEN:  Mild radiotracer uptake in a left para-aortic lymph node, SUV max of 2 9  This measures 1 1 x 1 0 cm image 270 series 3  No additional Dotatate avid lymph nodes  Normal liver, spleen, kidney, bowel and adrenal gland activity is demonstrated  Normal pancreatic uncinate process activity is also visualized  CT images: Scattered colonic diverticulosis  Spleen is enlarged at 14 3 cm in length  PELVIS: Lobular focal radiotracer uptake at the prostate centrally, SUV max of 9 1  No obvious discrete lesion based on the limited CT images  By PET, this measures up to 2 7 cm  Prostate is mildly enlarged  No Dotatate avid lymph nodes  CT images: Prostate is mildly prominent  Small fat-containing left inguinal hernia  OSSEOUS STRUCTURES:   There is a physiologic distribution of the radiotracer  CT images: No significant findings  Impression  1  Minimal radiotracer uptake in the right lower lobe nodule  This is nonspecific and would not be typical for a well-differentiated neuroendocrine carcinoid tumor  Further evaluation may include FDG PET/CT, 3 month follow-up chest CT or tissue sampling  2  Lobular focal radiotracer uptake at the prostate centrally  Mild radiotracer uptake in a left para-aortic lymph node  Though findings are non-specific, prostatic malignancy and metastasis should be excluded  Correlate with PSA levels and urologic evaluation  The study was marked in EPIC for significant notification   Workstation performed: [id number]

## 2022-05-03 NOTE — PROGRESS NOTES
Pt consented to SEER-Lung trial today, Study ID is [id number]  Pt was seen today at [facility] with Dr Kim Pereira  The ICF was reviewed and signed, and questioned were answered  ICF copy was given and gift card as well  Central labs were collected and processed  Pt was told to call for any questions or if he would like to withdraw

## 2022-05-06 ENCOUNTER — TELEPHONE (OUTPATIENT)
Dept: CARDIAC SURGERY | Facility: CLINIC | Age: 57
End: 2022-05-06

## 2022-05-06 NOTE — TELEPHONE ENCOUNTER
Patient called in regarding his ECG that he has scheduled for next week  Hazel Burns He was curious to know if he needed to tell his PCP anything regarding tht  Also if there was anything he needed to do before his ecg    Best call back #[phone]

## 2022-05-11 ENCOUNTER — CLINICAL SUPPORT (OUTPATIENT)
Dept: FAMILY MEDICINE CLINIC | Facility: CLINIC | Age: 57
End: 2022-05-11
Payer: COMMERCIAL

## 2022-05-11 DIAGNOSIS — Z01.818 PRE-OP TESTING: Primary | ICD-10-CM

## 2022-05-11 PROCEDURE — 93000 ELECTROCARDIOGRAM COMPLETE: CPT | Performed by: FAMILY MEDICINE

## 2022-05-12 ENCOUNTER — ANESTHESIA EVENT (OUTPATIENT)
Dept: PERIOP | Facility: HOSPITAL | Age: 57
End: 2022-05-12
Payer: COMMERCIAL

## 2022-05-13 ENCOUNTER — OFFICE VISIT (OUTPATIENT)
Dept: PHYSICAL THERAPY | Facility: CLINIC | Age: 57
End: 2022-05-13
Payer: COMMERCIAL

## 2022-05-13 DIAGNOSIS — M75.32 CALCIFIC TENDONITIS OF LEFT SHOULDER: ICD-10-CM

## 2022-05-13 DIAGNOSIS — M75.41 SUBACROMIAL IMPINGEMENT OF RIGHT SHOULDER: Primary | ICD-10-CM

## 2022-05-13 PROCEDURE — 97110 THERAPEUTIC EXERCISES: CPT

## 2022-05-13 PROCEDURE — 97112 NEUROMUSCULAR REEDUCATION: CPT

## 2022-05-13 NOTE — PROGRESS NOTES
Daily Note     Today's date: 2022  Patient name: Lamin Alegria  : 1965  MRN: [id number]  Referring provider: Aura Singer*  Dx:   Encounter Diagnosis     ICD-10-CM    1  Subacromial impingement of right shoulder  M75 41    2  Calcific tendonitis of left shoulder  M75 32                   Subjective: Patient reports that his shoulders have felt a little bit better since his IE  Reports doing the exercises at home  Objective: See treatment diary below      Assessment: Tolerated treatment well  Mild discomfort noted with pulleys this date especially during L shoulder flexion  Tolerated progression of exercise nicely this date with no increases in pain noted  Session focused largely on scapular stability exercises with fatigue noted at end of session  Minor UT compensations noted during TB rows which resolved with proper cueing  Patient provided with HEP  Continue to progress HEP each session  Progress as tolerated  Patient would benefit from continued PT  Plan: Continue per plan of care        Diagnosis: B/L Shoulder pain    Precautions: NO TAPE; 1x every 2 weeks progress HEP    POC Expires:    Re-evaluation Date:     FOTO Scores/Date: 54 ()   Visit Count 2       Manuals        [address] Mobs        [address] PROM                                Ther Ex        UBE 3'       Pulleys 1 5'/1 5'       Sidelying ER HEP       Scap retractions HEP       Doorway stretch HEP       Thoracic Extensions 20x        TB Rows  2x10 otb        TB Pulldowns  2x10 otb        A pull         T pull  2x10 gtb        Rhomboid stretch 15x 5"       IR strap stretch 10x5" ea side        High Row        Prone Y, T, I        Neuro Re-Ed        Prone retractions 2x15       Serratus Punch        Wax on/off 2x8 rtb                                                                                               Ther Act                                         Modalities

## 2022-05-16 ENCOUNTER — OFFICE VISIT (OUTPATIENT)
Dept: FAMILY MEDICINE CLINIC | Facility: CLINIC | Age: 57
End: 2022-05-16
Payer: COMMERCIAL

## 2022-05-16 VITALS
OXYGEN SATURATION: 99 % | HEART RATE: 70 BPM | SYSTOLIC BLOOD PRESSURE: 128 MMHG | BODY MASS INDEX: 42.52 KG/M2 | HEIGHT: 63 IN | DIASTOLIC BLOOD PRESSURE: 76 MMHG | WEIGHT: 240 LBS

## 2022-05-16 DIAGNOSIS — R73.01 IMPAIRED FASTING GLUCOSE: ICD-10-CM

## 2022-05-16 DIAGNOSIS — Z01.818 PRE-OP EXAMINATION: Primary | ICD-10-CM

## 2022-05-16 DIAGNOSIS — R91.1 LUNG NODULE: ICD-10-CM

## 2022-05-16 DIAGNOSIS — Z12.5 SCREENING FOR PROSTATE CANCER: ICD-10-CM

## 2022-05-16 DIAGNOSIS — E78.2 MIXED HYPERLIPIDEMIA: ICD-10-CM

## 2022-05-16 DIAGNOSIS — J30.1 ALLERGIC RHINITIS DUE TO POLLEN, UNSPECIFIED SEASONALITY: ICD-10-CM

## 2022-05-16 PROCEDURE — 3008F BODY MASS INDEX DOCD: CPT | Performed by: FAMILY MEDICINE

## 2022-05-16 PROCEDURE — 99213 OFFICE O/P EST LOW 20 MIN: CPT | Performed by: FAMILY MEDICINE

## 2022-05-16 RX ORDER — MONTELUKAST SODIUM 10 MG/1
10 TABLET ORAL
Qty: 90 TABLET | Refills: 3 | Status: SHIPPED | OUTPATIENT
Start: 2022-05-16

## 2022-05-16 NOTE — PROGRESS NOTES
Assessment/Plan:    No problem-specific Assessment & Plan notes found for this encounter  Diagnoses and all orders for this visit:    Pre-op examination  EKG 1st degree AV block  No symptoms    Cleared for procedure    -     CBC and differential; Future    Lung nodule    Allergic rhinitis due to pollen, unspecified seasonality  Comments:  taking zyrtec for allergy sx  add singulair at hs  call if no better- consider allergy evaluation  Orders:  -     montelukast (SINGULAIR) 10 mg tablet; Take 1 tablet (10 mg total) by mouth daily at bedtime    Mixed hyperlipidemia  -     Lipid panel; Future    Impaired fasting glucose  -     Comprehensive metabolic panel; Future  -     Hemoglobin A1C; Future    Screening for prostate cancer  -     PSA, Total Screen; Future      Follow up in 1 year or as needed    Subjective:      Patient ID: Mary Kate Jacobsen is a 61 y o  male  Patient is here for a Pre-op exam  He is going for bronchoscopy with biopsy for a pulmonary nodule to be done on 05/23 by Dr Yeison Duffy  He denies any symptoms at this time  Had an EKG done which showed 1st degree AV block denies any symptoms  The following portions of the patient's history were reviewed and updated as appropriate:   He  has a past medical history of Allergic (Almost life long sinus  Penicillin ), Hearing deficit, bilateral, HL (hearing loss) (Melisa Grumbling age), Lumbar herniated disc, Lung nodule, Obesity (2006), and Psoriasis    He   Patient Active Problem List    Diagnosis Date Noted    Pre-op examination 05/16/2022    Lung nodule     Subacromial impingement of right shoulder 04/21/2022    Calcific tendonitis of left shoulder 04/21/2022    Bilateral hearing loss 11/26/2021    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    Health maintenance examination 02/11/2021    Impaired fasting glucose 02/19/2020    Mixed hyperlipidemia 04/30/2019    ANNABEL (obstructive sleep apnea)     Class 2 severe obesity due to excess calories with serious comorbidity and body mass index (BMI) of 39 0 to 39 9 in adult [facility]) 01/14/2019    Allergic rhinitis due to pollen 05/10/2018    Psoriasis 05/10/2018    Screening for prostate cancer 05/10/2018     He  has a past surgical history that includes Hernia repair and Parotidectomy  His family history includes Breast cancer in his sister; Heart murmur in his father; Hypertension in his mother; Psoriasis in his father, sister, and sister; Rheum arthritis in his mother; Stroke in his father; Thyroid disease in his sister  He  reports that he quit smoking about 19 years ago  His smoking use included cigarettes and cigarettes  He started smoking about 38 years ago  He has a 10 00 pack-year smoking history  He has never used smokeless tobacco  He reports current alcohol use of about 12 0 standard drinks of alcohol per week  He reports that he does not use drugs  Current Outpatient Medications   Medication Sig Dispense Refill    montelukast (SINGULAIR) 10 mg tablet Take 1 tablet (10 mg total) by mouth daily at bedtime 90 tablet 3     No current facility-administered medications for this visit  Current Outpatient Medications on File Prior to Visit   Medication Sig    [DISCONTINUED] montelukast (SINGULAIR) 10 mg tablet TAKE 1 TABLET DAILY AT BEDTIME    [DISCONTINUED] albuterol (PROVENTIL HFA,VENTOLIN HFA) 90 mcg/act inhaler Inhale 2 puffs every 6 (six) hours as needed for wheezing     No current facility-administered medications on file prior to visit  He is allergic to latex and penicillins       Review of Systems   Constitutional: Negative for activity change, appetite change, fatigue and fever  HENT: Negative for congestion and ear discharge  Respiratory: Negative for cough and shortness of breath  Cardiovascular: Negative for chest pain and palpitations  Gastrointestinal: Negative for diarrhea and nausea  Musculoskeletal: Negative for arthralgias and back pain     Skin: Negative for color change and rash  Neurological: Negative for dizziness and headaches  Psychiatric/Behavioral: Negative for agitation and behavioral problems  Objective:      /76   Pulse 70   Ht 5' 3" (1 6 m)   Wt 109 kg (240 lb)   SpO2 99%   BMI 42 51 kg/m²          Physical Exam  Constitutional:       General: He is not in acute distress  Appearance: He is well-developed  He is not diaphoretic  Eyes:      General: No scleral icterus  Pupils: Pupils are equal, round, and reactive to light  Cardiovascular:      Rate and Rhythm: Normal rate and regular rhythm  Heart sounds: Normal heart sounds  No murmur heard  Pulmonary:      Effort: Pulmonary effort is normal  No respiratory distress  Breath sounds: Normal breath sounds  No wheezing  Abdominal:      General: Bowel sounds are normal  There is no distension  Palpations: Abdomen is soft  Tenderness: There is no abdominal tenderness  Skin:     General: Skin is warm and dry  Findings: No rash  Neurological:      Mental Status: He is alert and oriented to person, place, and time

## 2022-05-17 RX ORDER — ASCORBIC ACID 1000 MG
TABLET ORAL DAILY
COMMUNITY

## 2022-05-17 RX ORDER — DIPHENOXYLATE HYDROCHLORIDE AND ATROPINE SULFATE 2.5; .025 MG/1; MG/1
1 TABLET ORAL DAILY
COMMUNITY

## 2022-05-17 RX ORDER — CHLORAL HYDRATE 500 MG
1000 CAPSULE ORAL DAILY
COMMUNITY

## 2022-05-17 RX ORDER — TYROSINE 500 MG
CAPSULE ORAL DAILY
COMMUNITY

## 2022-05-17 NOTE — PRE-PROCEDURE INSTRUCTIONS
Pre-Surgery Instructions:   Medication Instructions    CALCIUM PO Stop taking 7 days prior to surgery   Capsicum, Cayenne, (CAYENNE PO) Stop taking 7 days prior to surgery   Coenzyme Q10 (CO Q 10 PO) Stop taking 7 days prior to surgery   Ginkgo Biloba 40 MG TABS Stop taking 7 days prior to surgery   MAGNESIUM PO Stop taking 7 days prior to surgery   montelukast (SINGULAIR) 10 mg tablet Take night before surgery    multivitamin (THERAGRAN) TABS Stop taking 7 days prior to surgery   NIACIN PO Stop taking 7 days prior to surgery   NON FORMULARY Stop taking 7 days prior to surgery   Omega-3 Fatty Acids (fish oil) 1,000 mg Stop taking 7 days prior to surgery   TURMERIC PO Stop taking 7 days prior to surgery   Tyrosine 500 MG CAPS Stop taking 7 days prior to surgery  Covid screening negative as per patient  Reviewed pre op medicine and showering instructions with patient via phone call, verbalizes understanding  Advised patient to stop taking non prescribed vitamins, herbal meds and ASA as of 5/17  Advised to stop taking NSAID's 3 days pre op but may take Tylenol products if needed  Advised to take DOS medicine with a small sip water  Advised NPO after MN prior to surgery and surgical services will call (5/20) with scheduled time of hospital arrival     Patient lost weight on own and is not interested in referral to weight management program     Pt verbalized understanding of all instructions

## 2022-05-19 ENCOUNTER — TELEPHONE (OUTPATIENT)
Dept: SURGICAL ONCOLOGY | Facility: CLINIC | Age: 57
End: 2022-05-19

## 2022-05-20 ENCOUNTER — APPOINTMENT (OUTPATIENT)
Dept: LAB | Facility: CLINIC | Age: 57
End: 2022-05-20
Payer: COMMERCIAL

## 2022-05-20 DIAGNOSIS — R73.01 IMPAIRED FASTING GLUCOSE: ICD-10-CM

## 2022-05-20 DIAGNOSIS — E78.2 MIXED HYPERLIPIDEMIA: ICD-10-CM

## 2022-05-20 DIAGNOSIS — Z01.818 PRE-OP EXAMINATION: ICD-10-CM

## 2022-05-20 DIAGNOSIS — Z12.5 SCREENING FOR PROSTATE CANCER: ICD-10-CM

## 2022-05-20 LAB
ALBUMIN SERPL BCP-MCNC: 3.9 G/DL (ref 3.5–5)
ALP SERPL-CCNC: 74 U/L (ref 46–116)
ALT SERPL W P-5'-P-CCNC: 31 U/L (ref 12–78)
ANION GAP SERPL CALCULATED.3IONS-SCNC: 3 MMOL/L (ref 4–13)
AST SERPL W P-5'-P-CCNC: 17 U/L (ref 5–45)
BASOPHILS # BLD AUTO: 0.07 THOUSANDS/ΜL (ref 0–0.1)
BASOPHILS NFR BLD AUTO: 1 % (ref 0–1)
BILIRUB SERPL-MCNC: 0.89 MG/DL (ref 0.2–1)
BUN SERPL-MCNC: 16 MG/DL (ref 5–25)
CALCIUM SERPL-MCNC: 9.6 MG/DL (ref 8.3–10.1)
CHLORIDE SERPL-SCNC: 108 MMOL/L (ref 100–108)
CHOLEST SERPL-MCNC: 174 MG/DL
CO2 SERPL-SCNC: 30 MMOL/L (ref 21–32)
CREAT SERPL-MCNC: 1.04 MG/DL (ref 0.6–1.3)
EOSINOPHIL # BLD AUTO: 0.19 THOUSAND/ΜL (ref 0–0.61)
EOSINOPHIL NFR BLD AUTO: 3 % (ref 0–6)
ERYTHROCYTE [DISTWIDTH] IN BLOOD BY AUTOMATED COUNT: 12.5 % (ref 11.6–15.1)
GFR SERPL CREATININE-BSD FRML MDRD: 78 ML/MIN/1.73SQ M
GLUCOSE P FAST SERPL-MCNC: 115 MG/DL (ref 65–99)
HCT VFR BLD AUTO: 43.8 % (ref 36.5–49.3)
HDLC SERPL-MCNC: 49 MG/DL
HGB BLD-MCNC: 15.2 G/DL (ref 12–17)
IMM GRANULOCYTES # BLD AUTO: 0.02 THOUSAND/UL (ref 0–0.2)
IMM GRANULOCYTES NFR BLD AUTO: 0 % (ref 0–2)
LDLC SERPL CALC-MCNC: 108 MG/DL (ref 0–100)
LYMPHOCYTES # BLD AUTO: 2.29 THOUSANDS/ΜL (ref 0.6–4.47)
LYMPHOCYTES NFR BLD AUTO: 34 % (ref 14–44)
MCH RBC QN AUTO: 30.8 PG (ref 26.8–34.3)
MCHC RBC AUTO-ENTMCNC: 34.7 G/DL (ref 31.4–37.4)
MCV RBC AUTO: 89 FL (ref 82–98)
MONOCYTES # BLD AUTO: 0.57 THOUSAND/ΜL (ref 0.17–1.22)
MONOCYTES NFR BLD AUTO: 9 % (ref 4–12)
NEUTROPHILS # BLD AUTO: 3.59 THOUSANDS/ΜL (ref 1.85–7.62)
NEUTS SEG NFR BLD AUTO: 53 % (ref 43–75)
NONHDLC SERPL-MCNC: 125 MG/DL
NRBC BLD AUTO-RTO: 0 /100 WBCS
PLATELET # BLD AUTO: 196 THOUSANDS/UL (ref 149–390)
PMV BLD AUTO: 10.3 FL (ref 8.9–12.7)
POTASSIUM SERPL-SCNC: 4.3 MMOL/L (ref 3.5–5.3)
PROT SERPL-MCNC: 7.3 G/DL (ref 6.4–8.2)
PSA SERPL-MCNC: 1 NG/ML (ref 0–4)
RBC # BLD AUTO: 4.93 MILLION/UL (ref 3.88–5.62)
SODIUM SERPL-SCNC: 141 MMOL/L (ref 136–145)
TRIGL SERPL-MCNC: 86 MG/DL
WBC # BLD AUTO: 6.73 THOUSAND/UL (ref 4.31–10.16)

## 2022-05-20 PROCEDURE — 85025 COMPLETE CBC W/AUTO DIFF WBC: CPT

## 2022-05-20 PROCEDURE — 80053 COMPREHEN METABOLIC PANEL: CPT

## 2022-05-20 PROCEDURE — 36415 COLL VENOUS BLD VENIPUNCTURE: CPT

## 2022-05-20 PROCEDURE — 83036 HEMOGLOBIN GLYCOSYLATED A1C: CPT

## 2022-05-20 PROCEDURE — G0103 PSA SCREENING: HCPCS

## 2022-05-20 PROCEDURE — 80061 LIPID PANEL: CPT

## 2022-05-21 LAB
EST. AVERAGE GLUCOSE BLD GHB EST-MCNC: 117 MG/DL
HBA1C MFR BLD: 5.7 %

## 2022-05-23 ENCOUNTER — HOSPITAL ENCOUNTER (OUTPATIENT)
Facility: HOSPITAL | Age: 57
Setting detail: OUTPATIENT SURGERY
Discharge: HOME | End: 2022-05-23
Attending: THORACIC SURGERY (CARDIOTHORACIC VASCULAR SURGERY) | Admitting: THORACIC SURGERY (CARDIOTHORACIC VASCULAR SURGERY)
Payer: COMMERCIAL

## 2022-05-23 ENCOUNTER — APPOINTMENT (OUTPATIENT)
Dept: RADIOLOGY | Facility: HOSPITAL | Age: 57
End: 2022-05-23
Payer: COMMERCIAL

## 2022-05-23 ENCOUNTER — ANESTHESIA (OUTPATIENT)
Dept: PERIOP | Facility: HOSPITAL | Age: 57
End: 2022-05-23
Payer: COMMERCIAL

## 2022-05-23 VITALS
TEMPERATURE: 96.1 F | OXYGEN SATURATION: 97 % | HEART RATE: 57 BPM | WEIGHT: 240 LBS | BODY MASS INDEX: 34.36 KG/M2 | DIASTOLIC BLOOD PRESSURE: 74 MMHG | RESPIRATION RATE: 18 BRPM | SYSTOLIC BLOOD PRESSURE: 120 MMHG | HEIGHT: 70 IN

## 2022-05-23 DIAGNOSIS — R91.1 PULMONARY NODULE: ICD-10-CM

## 2022-05-23 PROCEDURE — 88112 CYTOPATH CELL ENHANCE TECH: CPT | Performed by: PATHOLOGY

## 2022-05-23 PROCEDURE — 88305 TISSUE EXAM BY PATHOLOGIST: CPT | Performed by: PATHOLOGY

## 2022-05-23 PROCEDURE — 31629 BRONCHOSCOPY/NEEDLE BX EACH: CPT | Performed by: THORACIC SURGERY (CARDIOTHORACIC VASCULAR SURGERY)

## 2022-05-23 PROCEDURE — 71045 X-RAY EXAM CHEST 1 VIEW: CPT

## 2022-05-23 PROCEDURE — 31627 NAVIGATIONAL BRONCHOSCOPY: CPT | Performed by: THORACIC SURGERY (CARDIOTHORACIC VASCULAR SURGERY)

## 2022-05-23 PROCEDURE — 31654 BRONCH EBUS IVNTJ PERPH LES: CPT | Performed by: THORACIC SURGERY (CARDIOTHORACIC VASCULAR SURGERY)

## 2022-05-23 PROCEDURE — 88173 CYTOPATH EVAL FNA REPORT: CPT | Performed by: PATHOLOGY

## 2022-05-23 PROCEDURE — 31624 DX BRONCHOSCOPE/LAVAGE: CPT | Performed by: THORACIC SURGERY (CARDIOTHORACIC VASCULAR SURGERY)

## 2022-05-23 PROCEDURE — 88172 CYTP DX EVAL FNA 1ST EA SITE: CPT | Performed by: PATHOLOGY

## 2022-05-23 RX ORDER — ALBUTEROL SULFATE 90 UG/1
AEROSOL, METERED RESPIRATORY (INHALATION) AS NEEDED
Status: DISCONTINUED | OUTPATIENT
Start: 2022-05-23 | End: 2022-05-23

## 2022-05-23 RX ORDER — DEXAMETHASONE SODIUM PHOSPHATE 10 MG/ML
INJECTION, SOLUTION INTRAMUSCULAR; INTRAVENOUS AS NEEDED
Status: DISCONTINUED | OUTPATIENT
Start: 2022-05-23 | End: 2022-05-23

## 2022-05-23 RX ORDER — ONDANSETRON 2 MG/ML
4 INJECTION INTRAMUSCULAR; INTRAVENOUS ONCE AS NEEDED
Status: DISCONTINUED | OUTPATIENT
Start: 2022-05-23 | End: 2022-05-23 | Stop reason: HOSPADM

## 2022-05-23 RX ORDER — MIDAZOLAM HYDROCHLORIDE 2 MG/2ML
INJECTION, SOLUTION INTRAMUSCULAR; INTRAVENOUS AS NEEDED
Status: DISCONTINUED | OUTPATIENT
Start: 2022-05-23 | End: 2022-05-23

## 2022-05-23 RX ORDER — SODIUM CHLORIDE, SODIUM LACTATE, POTASSIUM CHLORIDE, CALCIUM CHLORIDE 600; 310; 30; 20 MG/100ML; MG/100ML; MG/100ML; MG/100ML
INJECTION, SOLUTION INTRAVENOUS CONTINUOUS PRN
Status: DISCONTINUED | OUTPATIENT
Start: 2022-05-23 | End: 2022-05-23

## 2022-05-23 RX ORDER — KETAMINE HCL IN NACL, ISO-OSM 100MG/10ML
SYRINGE (ML) INJECTION AS NEEDED
Status: DISCONTINUED | OUTPATIENT
Start: 2022-05-23 | End: 2022-05-23

## 2022-05-23 RX ORDER — PROPOFOL 10 MG/ML
INJECTION, EMULSION INTRAVENOUS CONTINUOUS PRN
Status: DISCONTINUED | OUTPATIENT
Start: 2022-05-23 | End: 2022-05-23

## 2022-05-23 RX ORDER — LIDOCAINE HYDROCHLORIDE 10 MG/ML
INJECTION, SOLUTION EPIDURAL; INFILTRATION; INTRACAUDAL; PERINEURAL AS NEEDED
Status: DISCONTINUED | OUTPATIENT
Start: 2022-05-23 | End: 2022-05-23

## 2022-05-23 RX ORDER — FENTANYL CITRATE 50 UG/ML
INJECTION, SOLUTION INTRAMUSCULAR; INTRAVENOUS AS NEEDED
Status: DISCONTINUED | OUTPATIENT
Start: 2022-05-23 | End: 2022-05-23

## 2022-05-23 RX ORDER — ONDANSETRON 2 MG/ML
INJECTION INTRAMUSCULAR; INTRAVENOUS AS NEEDED
Status: DISCONTINUED | OUTPATIENT
Start: 2022-05-23 | End: 2022-05-23

## 2022-05-23 RX ORDER — FENTANYL CITRATE/PF 50 MCG/ML
25 SYRINGE (ML) INJECTION
Status: DISCONTINUED | OUTPATIENT
Start: 2022-05-23 | End: 2022-05-23 | Stop reason: HOSPADM

## 2022-05-23 RX ORDER — ROCURONIUM BROMIDE 10 MG/ML
INJECTION, SOLUTION INTRAVENOUS AS NEEDED
Status: DISCONTINUED | OUTPATIENT
Start: 2022-05-23 | End: 2022-05-23

## 2022-05-23 RX ORDER — PROPOFOL 10 MG/ML
INJECTION, EMULSION INTRAVENOUS AS NEEDED
Status: DISCONTINUED | OUTPATIENT
Start: 2022-05-23 | End: 2022-05-23

## 2022-05-23 RX ADMIN — ROCURONIUM BROMIDE 10 MG: 50 INJECTION, SOLUTION INTRAVENOUS at 08:56

## 2022-05-23 RX ADMIN — Medication 20 MG: at 08:01

## 2022-05-23 RX ADMIN — FENTANYL CITRATE 100 MCG: 50 INJECTION, SOLUTION INTRAMUSCULAR; INTRAVENOUS at 08:01

## 2022-05-23 RX ADMIN — ROCURONIUM BROMIDE 10 MG: 50 INJECTION, SOLUTION INTRAVENOUS at 09:15

## 2022-05-23 RX ADMIN — ONDANSETRON 4 MG: 2 INJECTION INTRAMUSCULAR; INTRAVENOUS at 09:29

## 2022-05-23 RX ADMIN — PROPOFOL 170 MG: 10 INJECTION, EMULSION INTRAVENOUS at 08:01

## 2022-05-23 RX ADMIN — SODIUM CHLORIDE, SODIUM LACTATE, POTASSIUM CHLORIDE, AND CALCIUM CHLORIDE: .6; .31; .03; .02 INJECTION, SOLUTION INTRAVENOUS at 07:15

## 2022-05-23 RX ADMIN — ROCURONIUM BROMIDE 40 MG: 50 INJECTION, SOLUTION INTRAVENOUS at 08:01

## 2022-05-23 RX ADMIN — PROPOFOL 70 MCG/KG/MIN: 10 INJECTION, EMULSION INTRAVENOUS at 08:08

## 2022-05-23 RX ADMIN — LIDOCAINE HYDROCHLORIDE 50 MG: 10 INJECTION, SOLUTION EPIDURAL; INFILTRATION; INTRACAUDAL at 08:01

## 2022-05-23 RX ADMIN — ROCURONIUM BROMIDE 10 MG: 50 INJECTION, SOLUTION INTRAVENOUS at 08:24

## 2022-05-23 RX ADMIN — ALBUTEROL SULFATE 5 PUFF: 90 AEROSOL, METERED RESPIRATORY (INHALATION) at 08:11

## 2022-05-23 RX ADMIN — MIDAZOLAM 2 MG: 1 INJECTION INTRAMUSCULAR; INTRAVENOUS at 07:54

## 2022-05-23 RX ADMIN — DEXAMETHASONE SODIUM PHOSPHATE 10 MG: 10 INJECTION, SOLUTION INTRAMUSCULAR; INTRAVENOUS at 08:01

## 2022-05-23 RX ADMIN — SUGAMMADEX 200 MG: 100 INJECTION, SOLUTION INTRAVENOUS at 09:50

## 2022-05-23 NOTE — INTERVAL H&P NOTE
H&P reviewed  After examining the patient I find no changes in the patients condition since the H&P had been written  Vitals:    05/23/22 0603   BP: 114/74   Pulse: (!) 50   Resp: 20   Temp: 97 5 °F (36 4 °C)   SpO2: 98%     The patient was met in the preoperative area and today's plan was discussed at that time  We discussed that they have a concerning lung nodule and the goal of today's procedure was to biopsy the mass as well as evaluate/biopsies any concerning lymph nodes  Technically this procedure will be a robotic navigational bronchoscopy with endobronchial biopsy, endobronchial brushing, and endobronchial forceps biopsy as well as Bal   Afterwards we would do EBUS with biopsy to evaluate the lymph nodes  We discussed risks, benefits and alternatives of the procedure  Specifically, we discussed there is a 3-4% risk of pneumothorax and 1% risk of significant bleeding related to the procedure  Additional potential risks include COPD exacerbation, atelectasis and pneumonia  They understand that diagnostic yield is generally 80-85%  In the event of a nondiagnostic procedure, patient understands additional testing and follow-up may be necessary  Patient verbalizes understanding and consent has been signed  We will plan to perform postprocedure chest x-ray prior to discharge  Proceed with this procedure as scheduled       Latricia Vale MD

## 2022-05-23 NOTE — ANESTHESIA PREPROCEDURE EVALUATION
Procedure:  BRONCHOSCOPY NAVIGATIONAL (N/A Bronchus)  ENDOBRONCHIAL ULTRASOUND (EBUS) (N/A Bronchus)  BRONCHOSCOPY FLEXIBLE (N/A Bronchus)    Relevant Problems   CARDIO   (+) Mixed hyperlipidemia      PULMONARY   (+) ANNABEL (obstructive sleep apnea)        Physical Exam    Airway    Mallampati score: II         Dental   No notable dental hx     Cardiovascular      Pulmonary      Other Findings        Anesthesia Plan  ASA Score- 3     Anesthesia Type- general with ASA Monitors  Additional Monitors:   Airway Plan: ETT  Plan Factors-    Chart reviewed  Induction- intravenous  Postoperative Plan-     Informed Consent- Anesthetic plan and risks discussed with patient  I personally reviewed this patient with the CRNA  Discussed and agreed on the Anesthesia Plan with the CRNA  Kishan Hahn

## 2022-05-23 NOTE — OP NOTE
OPERATIVE REPORT  PATIENT NAME: Jose Miguel Mercado    :  1965  MRN: [id number]  Pt Location: [facility] OR ROOM 08    SURGERY DATE: 2022    Surgeon(s) and Role:     * Tremaine Lamar MD - Primary     * Ellen Rashid MD - Assisting    Preop Diagnosis:  Pulmonary nodule [R91 1]    Post-Op Diagnosis Codes:     * Pulmonary nodule [R91 1]    Procedure(s) (LRB):  BRONCHOSCOPY NAVIGATIONAL (N/A)  BRONCHOSCOPY FLEXIBLE (N/A)     1  Endobronchial ultrasound without biopsy  2  Navigational bronchoscopy with transbronchial biopsy of a right lower lobe mass, transbronchial brushing of a right lower lobe mass  3  Radial EBUS to verify mass location   4  BAL    Specimen(s):  ID Type Source Tests Collected by Time Destination   1 :  FNA Lung, Right Lower Lobe FINE NEEDLE ASPIRATION Tremaine Lamar MD 2022 0848    2 :  Brushing Lung, Right Lower Lobe Bronchial Brushing NON-GYNECOLOGIC CYTOLOGY Tremaine Lamar MD 2022    3 :  Brushing Lung, Right Lower Lobe Bronchial Brushing NON-GYNECOLOGIC CYTOLOGY Tremaine Lamar MD 2022 0930    4 :  Brushing Lung, Right Lower Lobe Bronchial Washing NON-GYNECOLOGIC CYTOLOGY Tremaine Lamar MD 2022 0945        Estimated Blood Loss:   Minimal    Drains:  * No LDAs found *    Anesthesia Type:   General    Operative Indications:  Pulmonary nodule [R801]  58-year-old male with a previous 20 pack-year smoking history and a 2 cm right lower lobe nodule    Operative Findings:  No definite evidence of malignancy  No significant lymphadenopathy    Complications:   None    Procedure and Technique:  After obtaining informed consent from the patient, they were transported to the operating room and placed supine on the OR table  General anesthesia was administered without issue and an endotracheal tube was placed   A formal time-out was performed at this time including patient, date of birth, intended procedure, antibiotic usage as appropriate, beta-blocker usage as appropriate and plans for specimen handling  An adult bronchoscope was inserted into the endotracheal tube and a thorough bronchoscopy was then performed examining the trachea, mainstem bronchi, sigmoid segmental and subsegmental bronchi  All mucus was suctioned out to improve visualization  There is no suspicion or identified risk for TB or other air born infectious disease  During our bronchoscopy we positioned the patient's endotracheal tube at least 4 cm above the main ruba  The endotracheal tube was then cut just above the patient's nose and secured in place  The  Ford endotracheal tube moeller and sheath was then attached to the endotracheal tube and secured to the bed in the appropriate position  The Ford field sensor was then positioned on the same side as the target lesion 3 patient pads were placed in the appropriate position  We verified that these were within our field that included the main trachea  The patient's bilateral arms were then tucked and we made sure to minimize any metal within our field  Prior to our procedure the patient had undergone a planning  CT of the chest, navigational protocol with thin slices  We had previously uploaded this to a [employer] [place] where our navigational pathway/ plan was determined  This was uploaded to our system prior to initiation of this procedure  We then started the robotic navigational bronchoscopy portion of our procedure  The robotic bronchoscope was inserted through the sheath moeller and into the endotracheal tube  We then went through our initialization/registration process  Once synched we began to navigate our to our target lesion  There was good correlation between robotic mapping and bronchoscopic imaging  With the assistance of the fused imaging and navigation plan we navigated out to our target mass  Our target lesion was in the right lower lobe    Once we identified our target lesion via navigation this was verified with radial EBUS  Our radial EBUS image was a eccentric view  Then using fluoroscopy we obtained a baseline image  The target lesion was able to be seen with fluoroscopy  Then using continuous fluoroscopy we began to take biopsies the target lesion  Multiple transbronchial biopsies were taken using the Olympus 21 gauge PeriView needle  Transbronchial needle biopsies were used to create slides that were evaluated via SHAI with our pathologist in the room  In total 8  transbronchial needle biopsies were taken at our target lesion  Finally we performed transbronchial brushing of the target lesion using the [facility] brush  A touch prep was performed on the brush and then the remainder was cut in CytoLyt  In total 3  trans bronchial brushings were performed at our target  Our specimens were immediately analyzed via [facility] pathology in the room showing no definite evidence of malignancy  The remainder of the specimens were sent in cell block  We then performed a BAL  Using 20 mL of sterile saline we flushed this down the robotic bronchoscope  The scope was carefully drawn back with suction to extract our BAL specimen  This was sent for culture and cytology  Once we confirmed hemostasis the robotic bronchoscope was safely withdrawn through the airway and our robotic navigational portion of the procedure was completed at this time  The Olympus EBUS scope was then inserted and used to identify mediastinal lymph node stations  Mediastinal stations 7, 10R, 4R, 2R, 4 L, and 2 L were visualized using ultrasound guidance  Color Doppler was used as needed to identify and avoid surrounding vasculature  There were no lymph nodes greater than 5 mm  These were not PET avid  Therefore we did not biopsy any of the mediastinal lymph nodes    There was no significant bleeding seen from the airway  This portion of the procedure was completed at this time     An adult bronchoscope was again reintroduced into the airway and the airway was fully suctioned out  Once hemostasis was verified the patient awoke and was extubated without issue  They were transferred to the PACU in stable condition  All specimen, needle, and instrument counts were verified at the conclusion of the procedure and were correct  We ordered a chest x-ray to evaluate for a pneumothorax in the PACU          I was present for the entire procedure    Patient Disposition:  PACU       SIGNATURE: Maximo Arias MD  DATE: May 23, 2022  TIME: 2:06 PM

## 2022-05-24 ENCOUNTER — TELEPHONE (OUTPATIENT)
Dept: FAMILY MEDICINE CLINIC | Facility: CLINIC | Age: 57
End: 2022-05-24

## 2022-05-24 NOTE — TELEPHONE ENCOUNTER
It looks like the biopsy results are still pending advise to follow up with doctor that performed the procedure

## 2022-05-27 ENCOUNTER — EVALUATION (OUTPATIENT)
Dept: PHYSICAL THERAPY | Facility: CLINIC | Age: 57
End: 2022-05-27
Payer: COMMERCIAL

## 2022-05-27 DIAGNOSIS — M75.32 CALCIFIC TENDONITIS OF LEFT SHOULDER: ICD-10-CM

## 2022-05-27 DIAGNOSIS — M75.41 SUBACROMIAL IMPINGEMENT OF RIGHT SHOULDER: Primary | ICD-10-CM

## 2022-05-27 PROCEDURE — 97140 MANUAL THERAPY 1/> REGIONS: CPT

## 2022-05-27 PROCEDURE — 97110 THERAPEUTIC EXERCISES: CPT

## 2022-05-27 NOTE — PROGRESS NOTES
PT Discharge     Today's date: 2022  Patient name: Renetta Bueno  : 1965  MRN: [id number]  Referring provider: Mehran Garcia  Dx:   Encounter Diagnosis     ICD-10-CM    1  Subacromial impingement of right shoulder  M75 41    2  Calcific tendonitis of left shoulder  M75 32                   Assessment  Assessment details: Roberta Kline is a 61 y o  male who presents with signs and symptoms consistent of referring diagnosis  Patient has made excellent progress during his time at [facility]  Patient has made large functional gains in ROM, strength, motor control, and mobility which has led to increased activity tolerance  Patient has met all of his functional and rehabilitation goals with FOTO surpassing expected score, indicating this large improvement in function  Patient was provided with a comprehensive HEP and educated on importance of performing HEP to minimize future complaints  Patient was receptive to information provided by therapist  At this time, patient is agreeable to and appropriate for d/c from therapy services  Thank you for this referral  D/c POC  Barriers to therapy:      Goals  Short Term Goals: to be achieved by 4 weeks  1) Patient to be independent with basic HEP  - met  2) Decrease pain to 3/10 at its worst - met  3) Increase shoulder ROM by 5-10 degrees in all planes- met  4) Increase shoulder strength by 1/2 MMT grade in all deficient planes  - met    Long Term Goals: to be achieved by discharge  1) FOTO equal to or greater than 55 - met  2) Patient to be independent with comprehensive HEP  - met  3) Patient will demonstrate maximal over head reaching- met  4) Increase UE strength to 5/5 MMT grade in all planes to improve a/iadls  - met  5) Patient to report no sleep interruption secondary to pain  - met      Plan  Plan details: D/c POC  Planned therapy interventions: home exercise program  Plan of Care beginning date: 2022  Plan of Care expiration date: 2022  Treatment plan discussed with: patient        Subjective Evaluation    History of Present Illness  Mechanism of injury: History of Current Injury: Patient reports that he has had chronic shoulder pain for some time now  About 6 months ago patient started to refinish his basement which started to aggravate his shoulder quite a bit  Patient reports that his shoulder was so painful that he was unable to sleep on his side  Patient notes that his L shoulder is more bothersome than his R  Pain location/Descriptors: Both shoulders anteriorly and on the top  Aggravating factors: Sleeping, overhead lifting, open doors   Easing factors: Rest   Imaging: X-rays   Special Questions: Ruel Gonzales denies a new onset of Dysphagia, Dysarthria and Diplopia  Patient goals:  Get out of PT  Hobbies/Interest: Auto mechanics, yard work   Occupation:     Quality of life: good    Pain  Current pain ratin  At best pain ratin  At worst pain ratin    Patient Goals  Patient goals for therapy: decreased pain, return to sport/leisure activities, independence with ADLs/IADLs, increased strength and increased motion      Ruel Gonzales reports a perceived improvement of 99%  Functional status measure is now 80  Patient notes pain has reduced to 1  Patient reports that he still has some pain with overhead lifting but notes that it has improved immensely  Patient finds that sleeping, reaching, mobility, and function have improved quite a bit  He reports moving 2 tons of rock this past week with no increases in shoulder pain  Overall, patient has met all goals and is ready for discharge  Patient is pleased with his progress  Thank you for this referral         Objective     Active Range of Motion   Left Shoulder   Flexion: 170 degrees   Abduction: 175 degrees with pain  External rotation BTH: T2   Internal rotation BTB: T12     Right Shoulder   Flexion: 170 degrees   Abduction: 180 degrees   External rotation BTH: T3   Internal rotation BTB: T10     Strength/Myotome Testing     Left Shoulder     Planes of Motion   Flexion: 5   Abduction: 5   External rotation at 0°: 5   Internal rotation at 0°: 5     Isolated Muscles   Lower trapezius: 4+   Middle deltoid: 4+   Upper trapezius: 5     Right Shoulder     Planes of Motion   Flexion: 5   Abduction: 5   External rotation at 0°: 5   Internal rotation at 0°: 5     Isolated Muscles   Lower trapezius: 5   Middle deltoid: 5   Upper trapezius: 5     Tests     Left Shoulder   Negative empty can, Hawkin's, Neer's, Speed's, scapular assistance  and bicep load   Right Shoulder   Negative empty can, Hawkin's, Speed's and bicep load test positive                   Diagnosis: B/L Shoulder pain    Precautions: NO TAPE; 1x every 2 weeks progress HEP    POC Expires: 6/24   Re-evaluation Date: 5/27    FOTO Scores/Date: 54 (4/29), 83 (5/27)   Visit Count 2 3      Manuals 5/13 5/27      [address] Mobs        [address] PROM        Re-eval   EB                      Ther Ex        UBE 3' 4' retro       Pulleys 1 5'/1 5'       Sidelying ER HEP       Scap retractions HEP       Doorway stretch HEP       Thoracic Extensions 20x        TB Rows  2x10 otb        TB Pulldowns  2x10 otb        HEP creation  EB + review       A pull         T pull  2x10 gtb        Rhomboid stretch 15x 5"       IR strap stretch 10x5" ea side        High Row        Prone Y, T, I        Neuro Re-Ed        Prone retractions 2x15       Serratus Punch        Wax on/off 2x8 rtb                                                                                               Ther Act                                         Modalities

## 2022-06-07 ENCOUNTER — OFFICE VISIT (OUTPATIENT)
Dept: CARDIAC SURGERY | Facility: CLINIC | Age: 57
End: 2022-06-07
Payer: COMMERCIAL

## 2022-06-07 ENCOUNTER — PREP FOR PROCEDURE (OUTPATIENT)
Dept: INTERVENTIONAL RADIOLOGY/VASCULAR | Facility: CLINIC | Age: 57
End: 2022-06-07

## 2022-06-07 VITALS
DIASTOLIC BLOOD PRESSURE: 82 MMHG | OXYGEN SATURATION: 99 % | HEART RATE: 64 BPM | TEMPERATURE: 97.3 F | BODY MASS INDEX: 34.09 KG/M2 | HEIGHT: 70 IN | SYSTOLIC BLOOD PRESSURE: 126 MMHG | RESPIRATION RATE: 18 BRPM | WEIGHT: 238.1 LBS

## 2022-06-07 DIAGNOSIS — R91.1 LUNG NODULE: Primary | ICD-10-CM

## 2022-06-07 PROCEDURE — 99213 OFFICE O/P EST LOW 20 MIN: CPT | Performed by: THORACIC SURGERY (CARDIOTHORACIC VASCULAR SURGERY)

## 2022-06-07 PROCEDURE — 3008F BODY MASS INDEX DOCD: CPT | Performed by: THORACIC SURGERY (CARDIOTHORACIC VASCULAR SURGERY)

## 2022-06-07 NOTE — PROGRESS NOTES
Thoracic Follow-Up  Assessment/Plan:    Lung nodule  We had a discussion with Mr Pedro Laureano regarding his recent navigational bronchoscopy, which did not reveal any malignancy  Dr Kamila Holt does believe this may be a carcinoid tumor and therefore is recommending an IR biopsy and complete PFT's  The tumor's location is not amenable to a wedge resection and would require a lobectomy  We will further discuss treatment options after the above testing is completed  If it turns out to be a hamartoma, we could continue to monitor this  He is in agreement with the plan  Diagnoses and all orders for this visit:    Lung nodule  -     Ambulatory Referral to Interventional Radiology; Future  -     Complete PFT with post bronchodilator; Future          Thoracic History       Diagnosis: Right lower lobe pulmonary nodule   Procedure: Navigational bronchoscopy with biopsy, EBUS, and BAL by Dr Alisha Rahman on 5/23/22  Pathology: Right lower lobe biopsy, bronchial brushings, and washings washings negative for malignancy  Right lower lobe BAL revealed atypical cellular changes seen  Patient ID: Amanda Pulido is a 61 y o  male  ECOG 0    HPI    Mr Pedro Laureano is a 60 yo gentleman who we saw in consultation on 5/3/22 for a right lower lobe pulmonary nodule measuring 2 0 x 1 3 cm with a SUV of 1 6 on PET scan from 12/23/21  CT scan from 3/30/22 revealed a stable 1 9 x 1 4 cm nodule  He underwent a navigational bronchoscopy on 5/23/22 at which time no malignancy was seen  He returns today to discuss these results  On discussion, he feels well without any fever, chills, hemoptysis, weight loss, cough, or shortness of breath  He has normal sinus issues, such as congestion and PND       The following portions of the patient's history were reviewed and updated as appropriate: allergies, current medications, past family history, past medical history, past social history, past surgical history and problem list     Review of Systems Objective:   Physical Exam  Vitals reviewed  Constitutional:       General: He is not in acute distress  Appearance: Normal appearance  He is well-developed  He is not ill-appearing or diaphoretic  HENT:      Head: Normocephalic  Comments: Hat on        Ears:      Comments: B/l hearing aids  Eyes:      General: No scleral icterus  Extraocular Movements: Extraocular movements intact  Neck:      Trachea: No tracheal deviation  Comments: Right neck scar well healed  Cardiovascular:      Rate and Rhythm: Normal rate and regular rhythm  Pulses: Normal pulses  Heart sounds: Normal heart sounds  No murmur heard  Pulmonary:      Effort: Pulmonary effort is normal  No respiratory distress  Breath sounds: Normal breath sounds  No wheezing  Abdominal:      General: Bowel sounds are normal  There is no distension  Palpations: Abdomen is soft  Musculoskeletal:         General: Normal range of motion  Cervical back: Normal range of motion and neck supple  Right lower leg: No edema  Left lower leg: No edema  Lymphadenopathy:      Cervical: No cervical adenopathy  Skin:     General: Skin is warm and dry  Neurological:      Mental Status: He is alert and oriented to person, place, and time  Cranial Nerves: No cranial nerve deficit  Psychiatric:         Mood and Affect: Mood normal          Behavior: Behavior normal          Thought Content:  Thought content normal      /82   Pulse 64   Temp (!) 97 3 °F (36 3 °C)   Resp 18   Ht 5' 10" (1 778 m)   Wt 108 kg (238 lb 1 6 oz)   SpO2 99%   BMI 34 16 kg/m²

## 2022-06-07 NOTE — ASSESSMENT & PLAN NOTE
We had a discussion with Mr Catie Brewer regarding his recent navigational bronchoscopy, which did not reveal any malignancy  Dr Twana Cowden does believe this may be a carcinoid tumor and therefore is recommending an IR biopsy and complete PFT's  The tumor's location is not amenable to a wedge resection and would require a lobectomy  We will further discuss treatment options after the above testing is completed  If it turns out to be a hamartoma, we could continue to monitor this  He is in agreement with the plan

## 2022-06-10 RX ORDER — SODIUM CHLORIDE 9 MG/ML
75 INJECTION, SOLUTION INTRAVENOUS CONTINUOUS
Status: DISCONTINUED | OUTPATIENT
Start: 2022-06-10 | End: 2022-06-15 | Stop reason: HOSPADM

## 2022-06-10 NOTE — PRE-PROCEDURE INSTRUCTIONS
Pre-procedure Instructions for Interventional Radiology  [address] [place] [address] [id number] [phone]    You are scheduled for a/an Right Lung Mass Biopsy  On Tuesday 6/14/22  Your tentative arrival time is 0900  Short stay will notify you the day before your procedure with the exact arrival time and the location to arrive  To prepare for your procedure:  1  Please arrange for someone to drive you home after the procedure and stay with you until the next morning if you are instructed to do so  This is typically for patients receiving some type of sedative or anesthetic for the procedure  2  DO NOT EAT OR DRINK ANYTHING after midnight on the evening before your procedure including candy & gum   3  ONLY SIPS OF WATER with your medications are allowed on the morning of your procedure  4  TAKE ALL OF YOUR REGULAR MEDICATIONS THE MORNING OF YOUR PROCEDURE with sips of water! We may call you to stop some of your blood sugar, blood pressure and blood thinning medications depending on the procedure  Please take all of these medications unless we instruct you to stop them  5  If you have an allergy to x-ray dye, please contact Interventional Radiology for an x-ray dye preparation which usually consists of an oral steroid and Benadryl  The day of your procedure:  1  Bring a list of the medications you take at home  2  Bring medications you take for breathing problems (such as inhalers), medications for chest pain, or both  3  Bring a case for your glasses or contacts  4  Bring your insurance card and a form of photo ID   5  Please leave all valuables such as credit cards and jewelry at home  6  Report to the registration desk in the main lobby at the [facility]  Ask to be directed to [facility]    7  While your procedure is being performed, your family may wait in the Radiology Waiting Room on the 1st floor in Radiology  if they need to leave, they may provide a number to be called following the procedure  8  Be prepared to stay overnight just in case  Sometimes procedures will indicate the need for further observation or treatment  9  If you are scheduled for a follow-up visit with the Interventional Radiologist after your procedure, you will be called with a date and time  10  Covid Screening completed no vaccine  Vaccine Encouraged  Special Instructions (Medications to stop taking before your procedure etc ):  Above reviewed with his wife Nanda Lomason

## 2022-06-14 ENCOUNTER — HOSPITAL ENCOUNTER (OUTPATIENT)
Dept: RADIOLOGY | Facility: HOSPITAL | Age: 57
Discharge: HOME | End: 2022-06-14
Attending: STUDENT IN AN ORGANIZED HEALTH CARE EDUCATION/TRAINING PROGRAM
Payer: COMMERCIAL

## 2022-06-14 ENCOUNTER — HOSPITAL ENCOUNTER (OUTPATIENT)
Dept: RADIOLOGY | Facility: HOSPITAL | Age: 57
Discharge: HOME | End: 2022-06-14
Attending: RADIOLOGY

## 2022-06-14 VITALS
SYSTOLIC BLOOD PRESSURE: 104 MMHG | DIASTOLIC BLOOD PRESSURE: 60 MMHG | OXYGEN SATURATION: 94 % | HEIGHT: 70 IN | BODY MASS INDEX: 34.36 KG/M2 | TEMPERATURE: 97.6 F | HEART RATE: 69 BPM | RESPIRATION RATE: 18 BRPM | WEIGHT: 240 LBS

## 2022-06-14 DIAGNOSIS — R91.1 LUNG NODULE: ICD-10-CM

## 2022-06-14 LAB
INR PPP: 1.05 (ref 0.84–1.19)
PROTHROMBIN TIME: 13.3 SECONDS (ref 11.6–14.5)

## 2022-06-14 PROCEDURE — 88305 TISSUE EXAM BY PATHOLOGIST: CPT | Performed by: PATHOLOGY

## 2022-06-14 PROCEDURE — 99152 MOD SED SAME PHYS/QHP 5/>YRS: CPT | Performed by: RADIOLOGY

## 2022-06-14 PROCEDURE — 88341 IMHCHEM/IMCYTCHM EA ADD ANTB: CPT | Performed by: PATHOLOGY

## 2022-06-14 PROCEDURE — 88185 FLOWCYTOMETRY/TC ADD-ON: CPT

## 2022-06-14 PROCEDURE — 88342 IMHCHEM/IMCYTCHM 1ST ANTB: CPT | Performed by: PATHOLOGY

## 2022-06-14 PROCEDURE — 88184 FLOWCYTOMETRY/ TC 1 MARKER: CPT | Performed by: THORACIC SURGERY (CARDIOTHORACIC VASCULAR SURGERY)

## 2022-06-14 PROCEDURE — 99152 MOD SED SAME PHYS/QHP 5/>YRS: CPT

## 2022-06-14 PROCEDURE — 32408 CORE NDL BX LNG/MED PERQ: CPT | Performed by: RADIOLOGY

## 2022-06-14 PROCEDURE — 77012 CT SCAN FOR NEEDLE BIOPSY: CPT

## 2022-06-14 PROCEDURE — 99153 MOD SED SAME PHYS/QHP EA: CPT

## 2022-06-14 PROCEDURE — 32408 CORE NDL BX LNG/MED PERQ: CPT

## 2022-06-14 PROCEDURE — 85610 PROTHROMBIN TIME: CPT | Performed by: STUDENT IN AN ORGANIZED HEALTH CARE EDUCATION/TRAINING PROGRAM

## 2022-06-14 PROCEDURE — 71045 X-RAY EXAM CHEST 1 VIEW: CPT

## 2022-06-14 RX ORDER — LIDOCAINE WITH 8.4% SOD BICARB 0.9%(10ML)
SYRINGE (ML) INJECTION CODE/TRAUMA/SEDATION MEDICATION
Status: COMPLETED | OUTPATIENT
Start: 2022-06-14 | End: 2022-06-14

## 2022-06-14 RX ORDER — FENTANYL CITRATE 50 UG/ML
INJECTION, SOLUTION INTRAMUSCULAR; INTRAVENOUS CODE/TRAUMA/SEDATION MEDICATION
Status: COMPLETED | OUTPATIENT
Start: 2022-06-14 | End: 2022-06-14

## 2022-06-14 RX ORDER — MIDAZOLAM HYDROCHLORIDE 2 MG/2ML
INJECTION, SOLUTION INTRAMUSCULAR; INTRAVENOUS CODE/TRAUMA/SEDATION MEDICATION
Status: COMPLETED | OUTPATIENT
Start: 2022-06-14 | End: 2022-06-14

## 2022-06-14 RX ORDER — ACETAMINOPHEN 325 MG/1
650 TABLET ORAL EVERY 4 HOURS PRN
Status: DISCONTINUED | OUTPATIENT
Start: 2022-06-14 | End: 2022-06-15 | Stop reason: HOSPADM

## 2022-06-14 RX ADMIN — MIDAZOLAM 1 MG: 1 INJECTION INTRAMUSCULAR; INTRAVENOUS at 11:52

## 2022-06-14 RX ADMIN — SODIUM CHLORIDE 75 ML/HR: 0.9 INJECTION, SOLUTION INTRAVENOUS at 09:14

## 2022-06-14 RX ADMIN — FENTANYL CITRATE 50 MCG: 50 INJECTION INTRAMUSCULAR; INTRAVENOUS at 11:59

## 2022-06-14 RX ADMIN — MIDAZOLAM 1 MG: 1 INJECTION INTRAMUSCULAR; INTRAVENOUS at 11:59

## 2022-06-14 RX ADMIN — FENTANYL CITRATE 50 MCG: 50 INJECTION INTRAMUSCULAR; INTRAVENOUS at 11:53

## 2022-06-14 RX ADMIN — Medication 10 ML: at 12:06

## 2022-06-14 NOTE — DISCHARGE INSTRUCTIONS
Needle Biopsy of the Lung    WHAT YOU NEED TO KNOW:  A needle biopsy of the lung is a procedure to remove cells or tissue from your lung  You may have a fine needle aspiration biopsy (FNAB), or a core needle biopsy (CNB)  A FNAB is used to remove cells through a thin needle  CNB uses a thicker needle to remove lung tissue  The samples are collected and tested for inflammation, infection, or cancer  DISCHARGE INSTRUCTIONS:   Resume your normal diet  Small sips of flat soda will help with nausea  Limit your activity for 24 hours  Wound Care:      - Remove band aid in 24 hours  Contact Interventional Radiology at [phone] [place] PATIENTS: Contact Interventional Radiology at [phone]) ([address]: Contact Interventional Radiology at [phone]) if any of the following occur:    - You have a fever greater than 101*    - You cough up large amounts of bright red blood     - You have chest pain with breathing    - You have shortness of breath    -You have persistent nausea and vomiting    - You have pus, redness or swelling around your biopsy site    - You have questions or concerns about your condition or care     Procedural Sedation   WHAT YOU NEED TO KNOW:   Procedural sedation is medicine used during procedures to help you feel relaxed and calm  You will remember little to none of the procedure  After sedation you may feel tired, weak, or unsteady on your feet  You may also have trouble concentrating or short-term memory loss  These symptoms should go away in 24 hours or less  DISCHARGE INSTRUCTIONS:   Call 911 or have someone else call for any of the following: You have sudden trouble breathing  You cannot be woken  Contact Interventional Radiology at [phone] PATIENTS: Contact Interventional Radiology at [phone] [facility] PATIENTS: Contact Interventional Radiology at [phone]) if any of the following occur:       You have a severe headache or dizziness  Your heart is beating faster than usual     You have a fever or chills  Your skin is itchy, swollen, or you have a rash  You have nausea or are vomiting for more than 8 hours after the procedure  You have questions or concerns about your condition or care  Self-care:   Have someone stay with you for 24 hours  This person can drive you to errands and help you do things around the house  This person can also watch for problems  Rest and do quiet activities for 24 hours  Do not exercise, ride a bike, or play sports  Stand up slowly to prevent dizziness and falls  Take short walks around the house with another person  Slowly return to your usual activities the next day  Do not drive or use dangerous machines or tools for 24 hours  You may injure yourself or others  Examples include a lawnmower, saw, or drill  Do not return to work for 24 hours if you use dangerous machines or tools for work  Do not make important decisions for 24 hours  For example, do not sign important papers or invest money  Drink liquids as directed  Liquids help flush the sedation medicine out of your body  Ask how much liquid to drink each day and which liquids are best for you  Eat small, frequent meals to prevent nausea and vomiting  Start with clear liquids such as juice or broth  If you do not vomit after clear liquids, you can eat your usual foods  Do not drink alcohol or take medicines that make you drowsy  This includes medicines that help you sleep and anxiety medicines  Ask your healthcare provider if it is safe for you to take pain medicine  Follow up with your healthcare provider as directed: Write down your questions so you remember to ask them during your visits

## 2022-06-14 NOTE — BRIEF OP NOTE (RAD/CATH)
INTERVENTIONAL RADIOLOGY PROCEDURE NOTE    Date: 6/14/2022    Procedure: IR BIOPSY LUNG    Preoperative diagnosis:   1  Lung nodule         Postoperative diagnosis: Same  Surgeon: Derrick Roblero MD     Assistant: None  No qualified resident was available  Blood loss:  Minimal    Specimens:  18 gauge core x 10     Findings:   Successful right lung nodule biopsy  Biopsy samples were fragmented and therefore numerous cores were obtained  There was trace pneumothorax after the biopsy  Patient was asymptomatic at that time  Will observe with serial chest x-ray  Complications: None immediate      Anesthesia: conscious sedation

## 2022-06-14 NOTE — SEDATION DOCUMENTATION
Right lower lobe lung nodule biopsy performed by Dr Devera Kocher  Procedure tolerated well, VSS  Small pneumothorax noted on post procedure scan  CXR ordered and will monitor  IR Procedure Bedrest Start Time is 1230

## 2022-06-15 LAB — SCAN RESULT: NORMAL

## 2022-06-23 ENCOUNTER — HOSPITAL ENCOUNTER (OUTPATIENT)
Dept: PULMONOLOGY | Facility: HOSPITAL | Age: 57
Discharge: HOME | End: 2022-06-23
Payer: COMMERCIAL

## 2022-06-23 DIAGNOSIS — R91.1 LUNG NODULE: ICD-10-CM

## 2022-06-23 PROCEDURE — 94760 N-INVAS EAR/PLS OXIMETRY 1: CPT

## 2022-06-23 PROCEDURE — 94729 DIFFUSING CAPACITY: CPT

## 2022-06-23 PROCEDURE — 94726 PLETHYSMOGRAPHY LUNG VOLUMES: CPT

## 2022-06-23 PROCEDURE — 94010 BREATHING CAPACITY TEST: CPT | Performed by: INTERNAL MEDICINE

## 2022-06-23 PROCEDURE — 94729 DIFFUSING CAPACITY: CPT | Performed by: INTERNAL MEDICINE

## 2022-06-23 PROCEDURE — 94010 BREATHING CAPACITY TEST: CPT

## 2022-06-23 PROCEDURE — 94726 PLETHYSMOGRAPHY LUNG VOLUMES: CPT | Performed by: INTERNAL MEDICINE

## 2022-06-23 RX ORDER — ALBUTEROL SULFATE 2.5 MG/3ML
2.5 SOLUTION RESPIRATORY (INHALATION) ONCE
Status: DISCONTINUED | OUTPATIENT
Start: 2022-06-23 | End: 2022-06-27 | Stop reason: HOSPADM

## 2022-07-05 ENCOUNTER — OFFICE VISIT (OUTPATIENT)
Dept: CARDIAC SURGERY | Facility: CLINIC | Age: 57
End: 2022-07-05
Payer: COMMERCIAL

## 2022-07-05 VITALS
HEART RATE: 73 BPM | SYSTOLIC BLOOD PRESSURE: 118 MMHG | HEIGHT: 70 IN | WEIGHT: 240.3 LBS | BODY MASS INDEX: 34.4 KG/M2 | DIASTOLIC BLOOD PRESSURE: 70 MMHG | TEMPERATURE: 98.2 F | RESPIRATION RATE: 17 BRPM | OXYGEN SATURATION: 98 %

## 2022-07-05 DIAGNOSIS — R91.1 LUNG NODULE: Primary | ICD-10-CM

## 2022-07-05 PROCEDURE — 99213 OFFICE O/P EST LOW 20 MIN: CPT | Performed by: THORACIC SURGERY (CARDIOTHORACIC VASCULAR SURGERY)

## 2022-07-05 NOTE — PROGRESS NOTES
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  There was no evidence of carcinoid or carcinoma  I would like to continue to follow this to make sure there was no significant growth and to help confirm the needle biopsy results  We will see him back in approximately 9 months with a repeat CT scan of the chest without contrast   I spent 20 minutes discussing the pathology and its implications with the patient  Diagnoses and all orders for this visit:    Lung nodule  -     CT chest wo contrast; Future          Thoracic History    Diagnosis: Right lower lobe pulmonary nodule   Procedure: Navigational bronchoscopy with biopsy, EBUS, and BAL by Dr Sedonia Gilford on 5/23/22  Transthoracic needle biopsy on June 14, 2022  Pathology: Right lower lobe biopsy, bronchial brushings, and washings washings negative for malignancy  Right lower lobe BAL revealed atypical cellular changes seen  Transthoracic needle biopsy pathology is consistent with a proliferative cartilaginous process  This was consistent with a non fat containing hamartoma  There was no evidence of a neuroendocrine pleura variation or carcinoma  Subjective:    Patient ID: Bk Stark is a 61 y o  male  Mr Erik Davis returns the office today to discuss his recent transthoracic needle biopsy as well as his pulmonary function testing  He had a fairly stable, smoothly marginated 2 cm nodule in his right lower lobe  Initial attempts at navigational bronchoscopy were nondiagnostic  He underwent transthoracic needle biopsy on June 14, 2022 and this demonstrated proliferative cartilage consistent with a non fat containing hamartoma  There was no evidence of neuroendocrine proliferation to suggest carcinoid nor was there any evidence of carcinoma    His lung function tests were completely normal     He has done very well after his biopsy and denies shortness of breath, chest pain, or hemoptysis  The following portions of the patient's history were reviewed and updated as appropriate: allergies, current medications, past family history, past medical history, past social history, past surgical history and problem list     Review of Systems      Objective:   Physical ExamBP 118/70   Pulse 73   Temp 98 2 °F (36 8 °C)   Resp 17   Ht 5' 10" (1 778 m)   Wt 109 kg (240 lb 4 8 oz)   SpO2 98%   BMI 34 48 kg/m²           CT chest w contrast    Result Date: 4/2/2022  Narrative CT CHEST WITH IV CONTRAST INDICATION:   R91 1: Solitary pulmonary nodule R93 89: Abnormal findings on diagnostic imaging of other specified body structures  COMPARISON:  Chest CT from 12/12/2021 and PET/CT from 12/23/2021  TECHNIQUE: Chest CT with intravenous contrast   Axial, sagittal, coronal 2D reformats and coronal MIPS from source data  Radiation dose length product (DLP):  330 mGy-cm   Radiation dose exposure minimized using iterative reconstruction and automated exposure control  IV Contrast:  85 mL of iohexol (OMNIPAQUE) FINDINGS: LUNGS:  Stable 1 9 x 1 4 cm smoothly marginated solid right lower lobe nodule since December 2021  AIRWAYS: No significant filling defects  PLEURA:  Unremarkable  HEART/GREAT VESSELS:  Normal for age  MEDIASTINUM AND WILLIAMS:  Unremarkable  CHEST WALL AND LOWER NECK: Unremarkable  UPPER ABDOMEN:  Unremarkable  OSSEOUS STRUCTURES: Mild degenerative disease in the spine  Impression Stable 1 9 x 1 4 cm smoothly marginated right lower lobe nodule since December 2021  This could be a carcinoid tumor or benign hamartoma despite the absence of fat  Workstation performed: [id number]     NM PET CT skull base to mid thigh    Result Date: 12/27/2021  Narrative NETSPOT PET/CT SCAN  INDICATION:  Abnormal CT  Evaluate for carcinoid tumor   R91 1: Solitary pulmonary nodule R93 89: Abnormal findings on diagnostic imaging of other specified body structures  MODIFIER: PI      COMPARISON:  CT chest 12/12/2021  CELL TYPE:  None  TECHNIQUE:   5 3 mCi Gallium-68 Dotatate Netspot administered IV  Multiplanar attenuation corrected and non-attenuation corrected PET images were acquired 60 minutes post injection  Contiguous, low dose, axial CT sections were obtained from the vertex through the femurs for anatomic localization  Intravenous contrast was not utilized  FINDINGS:   BRAIN:    Normal pituitary gland uptake is demonstrated  No acute abnormalities are seen  HEAD/NECK:  There is a physiologic distribution of the radiotracer  Normal salivary gland and thyroid uptake is demonstrated  CT images:  Unremarkable  CHEST:   Minimal radiotracer uptake related to the smoothly marginated right lower lobe nodule  The SUV max here is 1 6  Nodule measures 1 9 x 1 4 cm in size image 137 series 3, stable previously 2 0 x 1 3 cm  CT images: Previously noted patchy ground glass infiltrates bilaterally have mostly cleared  ABDOMEN:  Mild radiotracer uptake in a left para-aortic lymph node, SUV max of 2 9  This measures 1 1 x 1 0 cm image 270 series 3  No additional Dotatate avid lymph nodes  Normal liver, spleen, kidney, bowel and adrenal gland activity is demonstrated  Normal pancreatic uncinate process activity is also visualized  CT images: Scattered colonic diverticulosis  Spleen is enlarged at 14 3 cm in length  PELVIS: Lobular focal radiotracer uptake at the prostate centrally, SUV max of 9 1  No obvious discrete lesion based on the limited CT images  By PET, this measures up to 2 7 cm  Prostate is mildly enlarged  No Dotatate avid lymph nodes  CT images: Prostate is mildly prominent  Small fat-containing left inguinal hernia  OSSEOUS STRUCTURES:   There is a physiologic distribution of the radiotracer  CT images: No significant findings  Impression  1  Minimal radiotracer uptake in the right lower lobe nodule    This is nonspecific and would not be typical for a well-differentiated neuroendocrine carcinoid tumor  Further evaluation may include FDG PET/CT, 3 month follow-up chest CT or tissue sampling  2  Lobular focal radiotracer uptake at the prostate centrally  Mild radiotracer uptake in a left para-aortic lymph node  Though findings are non-specific, prostatic malignancy and metastasis should be excluded  Correlate with PSA levels and urologic evaluation  The study was marked in EPIC for significant notification   Workstation performed: [id number]

## 2022-07-05 NOTE — ASSESSMENT & PLAN NOTE
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

## 2022-10-12 PROBLEM — Z00.00 HEALTH MAINTENANCE EXAMINATION: Status: RESOLVED | Noted: 2021-02-11 | Resolved: 2022-10-12

## 2022-11-04 ENCOUNTER — VBI (OUTPATIENT)
Dept: ADMINISTRATIVE | Facility: OTHER | Age: 57
End: 2022-11-04

## 2023-01-18 ENCOUNTER — OFFICE VISIT (OUTPATIENT)
Dept: URGENT CARE | Facility: CLINIC | Age: 58
End: 2023-01-18

## 2023-01-18 VITALS
DIASTOLIC BLOOD PRESSURE: 69 MMHG | OXYGEN SATURATION: 98 % | SYSTOLIC BLOOD PRESSURE: 128 MMHG | RESPIRATION RATE: 18 BRPM | TEMPERATURE: 96 F | HEART RATE: 75 BPM

## 2023-01-18 DIAGNOSIS — B34.9 VIRAL ILLNESS: ICD-10-CM

## 2023-01-18 DIAGNOSIS — R05.1 ACUTE COUGH: Primary | ICD-10-CM

## 2023-01-18 LAB
SARS-COV-2 AG UPPER RESP QL IA: NEGATIVE
VALID CONTROL: NORMAL

## 2023-01-18 NOTE — PROGRESS NOTES
[address]        NAME: Gayla Crawford is a 60 y o  male  : 1965    MRN: [id number]  DATE: 2023  TIME: 2:40 PM    Assessment and Plan   Acute cough [R05 1]  1  Acute cough  Poct Covid 19 Rapid Antigen Test      2  Viral illness          Rapid COVID negative  Work note given  Patient Instructions     Edilma Osorio diet and advance as tolerated  Small frequent meals  Probiotics  Follow up with PCP in 3-5 days  Proceed to the ER with worsening symptoms  Chief Complaint     Chief Complaint   Patient presents with   • Cold Like Symptoms     H/a, chills, body aches, and loose stools started yesterday  Took tylenol  Last night  States hes feeling slightly better  History of Present Illness       The patient presents today with complaints of chills, headache, body aches, and diarrhea that started yesterday  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  He has been taking tylenol with some relief  Review of Systems   Review of Systems   Constitutional: Positive for appetite change (decreased) and chills  Negative for fatigue and fever  HENT: Negative for congestion, ear pain, postnasal drip, rhinorrhea, sinus pressure, sinus pain and sore throat  Eyes: Negative  Respiratory: Negative for cough and shortness of breath  Cardiovascular: Negative for chest pain and palpitations  Gastrointestinal: Positive for diarrhea  Negative for abdominal pain, nausea and vomiting  Genitourinary: Negative for difficulty urinating  Musculoskeletal: Positive for myalgias  Skin: Negative for rash  Allergic/Immunologic: Negative for environmental allergies  Neurological: Positive for headaches  Negative for dizziness  Psychiatric/Behavioral: Negative            Current Medications       Current Outpatient Medications:   •  CALCIUM PO, Take by mouth daily, Disp: , Rfl:   •  Capsicum, [place], (CAYENNE PO), Take by mouth in the morning, Disp: , Rfl:   •  Coenzyme Q10 (CO Q 10 PO), Take by mouth daily, Disp: , Rfl:   •  Ginkgo Biloba 40 MG TABS, Take by mouth in the morning, Disp: , Rfl:   •  MAGNESIUM PO, Take by mouth in the morning, Disp: , Rfl:   •  montelukast (SINGULAIR) 10 mg tablet, Take 1 tablet (10 mg total) by mouth daily at bedtime, Disp: 90 tablet, Rfl: 3  •  multivitamin (THERAGRAN) TABS, Take 1 tablet by mouth in the morning , Disp: , Rfl:   •  NIACIN PO, Take by mouth daily, Disp: , Rfl:   •  NON FORMULARY, in the morning DMAE capsule, Disp: , Rfl:   •  Omega-3 Fatty Acids (fish oil) 1,000 mg, Take 1,000 mg by mouth in the morning , Disp: , Rfl:   •  TURMERIC PO, Take by mouth, Disp: , Rfl:   •  Tyrosine 500 MG CAPS, Take by mouth daily, Disp: , Rfl:     Current Allergies     Allergies as of 01/18/2023 - Reviewed 01/18/2023   Allergen Reaction Noted   • Other Blisters 05/17/2022   • Latex Rash 05/16/2022   • Penicillins Rash 05/10/2018            The following portions of the patient's history were reviewed and updated as appropriate: allergies, current medications, past family history, past medical history, past social history, past surgical history and problem list      Past Medical History:   Diagnosis Date   • Allergic Almost life long sinus  Penicillin     • Hearing deficit, bilateral    • HL (hearing loss) Young age    Hearing aids worn full time   • Lumbar herniated disc     S1-L5/L5-L4   • Lung nodule    • Obesity 2006   • Pneumonia    • Psoriasis        Past Surgical History:   Procedure Laterality Date   • BRONCHOSCOPY N/A 5/23/2022    Procedure: BRONCHOSCOPY NAVIGATIONAL;  Surgeon: Yessica Waters MD;  [facility];  Service: Thoracic   • COLONOSCOPY     • HERNIA REPAIR     • IR BIOPSY LUNG  6/14/2022   • LIPOMA RESECTION Bilateral     abdomen   • PAROTIDECTOMY     • PA [address] INCL FLUOR GDNCE DX W/CELL WASHG [facility] N/A 5/23/2022    Procedure: BRONCHOSCOPY FLEXIBLE;  Surgeon: Stone Garcia Natty Jeff MD;  [facility];  Service: Thoracic   • WISDOM TOOTH EXTRACTION         Family History   Problem Relation Age of Onset   • Hypertension Mother    • Rheum arthritis Mother    • Stroke Father         Heart murmur   • Heart murmur Father    • Psoriasis Father    • Thyroid disease Sister    • Breast cancer Sister    • Psoriasis Sister    • Psoriasis Sister          Medications have been verified  Objective   /69 (BP Location: Right arm, Patient Position: Sitting, Cuff Size: Standard)   Pulse 75   Temp (!) 96 °F (35 6 °C) (Temporal)   Resp 18   SpO2 98%        Physical Exam     Physical Exam  Vitals and nursing note reviewed  Constitutional:       General: He is not in acute distress  Appearance: Normal appearance  He is not ill-appearing  HENT:      Head: Normocephalic and atraumatic  Right Ear: Tympanic membrane, ear canal and external ear normal  There is no impacted cerumen  No foreign body  Tympanic membrane is not injected, erythematous or bulging  Left Ear: Tympanic membrane, ear canal and external ear normal  There is no impacted cerumen  No foreign body  Tympanic membrane is not injected, erythematous or bulging  Nose: Nose normal  No congestion or rhinorrhea  Mouth/Throat:      Lips: Pink  Mouth: Mucous membranes are moist       Pharynx: Oropharynx is clear  Posterior oropharyngeal erythema present  No oropharyngeal exudate  Tonsils: No tonsillar exudate  Eyes:      General: Vision grossly intact  Extraocular Movements: Extraocular movements intact  Pupils: Pupils are equal, round, and reactive to light  Cardiovascular:      Rate and Rhythm: Normal rate and regular rhythm  Heart sounds: Normal heart sounds  No murmur heard  Pulmonary:      Effort: Pulmonary effort is normal  No respiratory distress  Breath sounds: Normal breath sounds  No decreased air movement   No decreased breath sounds, wheezing, rhonchi or rales    Abdominal:      General: Abdomen is flat  Bowel sounds are normal  There is no distension  Palpations: Abdomen is soft  Tenderness: There is no abdominal tenderness  Musculoskeletal:         General: Normal range of motion  Cervical back: Normal range of motion  Skin:     General: Skin is warm  Findings: No rash  Neurological:      Mental Status: He is alert and oriented to person, place, and time  Motor: Motor function is intact     Psychiatric:         Attention and Perception: Attention normal          Mood and Affect: Mood normal

## 2023-01-18 NOTE — PATIENT INSTRUCTIONS
[place] diet and advance as tolerated  Small frequent meals  Probiotics  Follow up with PCP in 3-5 days  Proceed to the ER with worsening symptoms  Gastroenteritis   AMBULATORY CARE:   Gastroenteritis , or stomach flu, is an infection of the stomach and intestines  It is caused by bacteria, parasites, or viruses  Common symptoms include the following:   Diarrhea or gas    Nausea, vomiting, or poor appetite    Abdominal cramps, pain, or gurgling    Fever    Tiredness or weakness    Headaches or muscle aches with any of the above symptoms    Call 911 for any of the following: You have trouble breathing or a very fast pulse  Seek care immediately if:   You see blood in your diarrhea  You cannot stop vomiting  You have not urinated for 12 hours  You feel like you are going to faint  Contact your healthcare provider if:   You have a fever  You continue to vomit or have diarrhea, even after treatment  You see worms in your diarrhea  Your mouth or eyes are dry  You are not urinating as much or as often  You have questions or concerns about your condition or care  Treatment for gastroenteritis  may include medicines to slow or stop your diarrhea or vomiting  You may also need medicines to treat an infection caused by bacteria or a parasite  Manage your symptoms:   Drink liquids as directed  Ask your healthcare provider how much liquid to drink each day, and which liquids are best for you  You may also need to drink an oral rehydration solution (ORS)  An ORS has the right amounts of sugar, salt, and minerals in water to replace body fluids  Eat bland foods  When you feel hungry, begin eating soft, bland foods  Examples are bananas, clear soup, potatoes, and applesauce  Do not have dairy products, alcohol, sugary drinks, or drinks with caffeine until you feel better  Rest as much as possible  Slowly start to do more each day when you begin to feel better      Prevent the spread of germs:  Gastroenteritis can spread easily  Keep yourself, your family, and your surroundings clean to help prevent the spread of gastroenteritis:  Wash your hands often  Use soap and water  Wash your hands after you use the bathroom, change a child's diapers, or sneeze  Wash your hands before you prepare or eat food  Clean surfaces and do laundry often  Wash your clothes and towels separately from the rest of the laundry  Clean surfaces in your home with antibacterial  or bleach  Clean food thoroughly and cook safely  Wash raw vegetables before you cook  Cook meat, fish, and eggs fully  Do not use the same dishes for raw meat as you do for other foods  Refrigerate any leftover food immediately  Be aware when you camp or travel  Drink only clean water  Do not drink from rivers or lakes unless you purify or boil the water first  When you travel, drink bottled water and do not add ice  Do not eat fruit that has not been peeled  Do not eat raw fish or meat that is not fully cooked  Follow up with your doctor as directed:  Write down your questions so you remember to ask them during your visits  © Copyright [employer] 2022 Information is for End User's use only and may not be sold, redistributed or otherwise used for commercial purposes  All illustrations and images included in CareNotes® are the copyrighted property of [employer]  or Silva [employer]arr[employer]  The above information is an  only  It is not intended as medical advice for individual conditions or treatments  Talk to your doctor, nurse or pharmacist before following any medical regimen to see if it is safe and effective for you

## 2023-01-18 NOTE — LETTER
January 18, 2023    Patient:  Jonathan Rosales  YOB: 1965  Date of Last Encounter: Visit date not found    To whom it may concern:    Jonathan Rosales has tested negative for COVID-19 (Coronavirus)  He may return to work on 1/20/2023 as long as he has been symptoms free for 24 hours      Sincerely,        DEDRICK Suarez

## 2023-03-28 ENCOUNTER — HOSPITAL ENCOUNTER (OUTPATIENT)
Dept: CT IMAGING | Facility: HOSPITAL | Age: 58
Discharge: HOME | End: 2023-03-28
Attending: THORACIC SURGERY (CARDIOTHORACIC VASCULAR SURGERY)

## 2023-03-28 DIAGNOSIS — R91.1 LUNG NODULE: ICD-10-CM

## 2023-09-06 DIAGNOSIS — Z12.5 SCREENING FOR PROSTATE CANCER: Primary | ICD-10-CM

## 2023-09-06 DIAGNOSIS — J30.1 ALLERGIC RHINITIS DUE TO POLLEN, UNSPECIFIED SEASONALITY: ICD-10-CM

## 2023-09-06 DIAGNOSIS — E78.2 MIXED HYPERLIPIDEMIA: ICD-10-CM

## 2023-09-06 DIAGNOSIS — R73.01 IMPAIRED FASTING GLUCOSE: ICD-10-CM

## 2023-09-06 RX ORDER — MONTELUKAST SODIUM 10 MG/1
10 TABLET ORAL
Qty: 90 TABLET | Refills: 3 | Status: SHIPPED | OUTPATIENT
Start: 2023-09-06

## 2023-10-17 ENCOUNTER — VBI (OUTPATIENT)
Dept: ADMINISTRATIVE | Facility: OTHER | Age: 58
End: 2023-10-17

## 2023-11-07 ENCOUNTER — APPOINTMENT (OUTPATIENT)
Dept: LAB | Facility: CLINIC | Age: 58
End: 2023-11-07
Payer: COMMERCIAL

## 2023-11-07 DIAGNOSIS — Z12.5 SCREENING FOR PROSTATE CANCER: ICD-10-CM

## 2023-11-07 DIAGNOSIS — R73.01 IMPAIRED FASTING GLUCOSE: ICD-10-CM

## 2023-11-07 DIAGNOSIS — E78.2 MIXED HYPERLIPIDEMIA: ICD-10-CM

## 2023-11-07 LAB
ALBUMIN SERPL BCP-MCNC: 4.6 G/DL (ref 3.5–5)
ALP SERPL-CCNC: 51 U/L (ref 34–104)
ALT SERPL W P-5'-P-CCNC: 24 U/L (ref 7–52)
ANION GAP SERPL CALCULATED.3IONS-SCNC: 9 MMOL/L
AST SERPL W P-5'-P-CCNC: 19 U/L (ref 13–39)
BILIRUB SERPL-MCNC: 1.09 MG/DL (ref 0.2–1)
BUN SERPL-MCNC: 10 MG/DL (ref 5–25)
CALCIUM SERPL-MCNC: 9.5 MG/DL (ref 8.4–10.2)
CHLORIDE SERPL-SCNC: 105 MMOL/L (ref 96–108)
CHOLEST SERPL-MCNC: 171 MG/DL
CO2 SERPL-SCNC: 27 MMOL/L (ref 21–32)
CREAT SERPL-MCNC: 0.96 MG/DL (ref 0.6–1.3)
EST. AVERAGE GLUCOSE BLD GHB EST-MCNC: 117 MG/DL
GFR SERPL CREATININE-BSD FRML MDRD: 86 ML/MIN/1.73SQ M
GLUCOSE P FAST SERPL-MCNC: 115 MG/DL (ref 65–99)
HBA1C MFR BLD: 5.7 %
HDLC SERPL-MCNC: 46 MG/DL
LDLC SERPL CALC-MCNC: 101 MG/DL (ref 0–100)
NONHDLC SERPL-MCNC: 125 MG/DL
POTASSIUM SERPL-SCNC: 4.3 MMOL/L (ref 3.5–5.3)
PROT SERPL-MCNC: 7.1 G/DL (ref 6.4–8.4)
PSA SERPL-MCNC: 0.91 NG/ML (ref 0–4)
SODIUM SERPL-SCNC: 141 MMOL/L (ref 135–147)
TRIGL SERPL-MCNC: 118 MG/DL

## 2023-11-07 PROCEDURE — 80053 COMPREHEN METABOLIC PANEL: CPT

## 2023-11-07 PROCEDURE — 80061 LIPID PANEL: CPT

## 2023-11-07 PROCEDURE — 36415 COLL VENOUS BLD VENIPUNCTURE: CPT

## 2023-11-07 PROCEDURE — G0103 PSA SCREENING: HCPCS

## 2023-11-07 PROCEDURE — 83036 HEMOGLOBIN GLYCOSYLATED A1C: CPT

## 2023-11-14 ENCOUNTER — OFFICE VISIT (OUTPATIENT)
Dept: FAMILY MEDICINE CLINIC | Facility: CLINIC | Age: 58
End: 2023-11-14
Payer: COMMERCIAL

## 2023-11-14 VITALS
TEMPERATURE: 97.8 F | WEIGHT: 248.8 LBS | BODY MASS INDEX: 35.62 KG/M2 | HEART RATE: 84 BPM | DIASTOLIC BLOOD PRESSURE: 84 MMHG | HEIGHT: 70 IN | SYSTOLIC BLOOD PRESSURE: 122 MMHG | OXYGEN SATURATION: 98 %

## 2023-11-14 DIAGNOSIS — R73.01 IMPAIRED FASTING GLUCOSE: ICD-10-CM

## 2023-11-14 DIAGNOSIS — Z00.00 HEALTH MAINTENANCE EXAMINATION: Primary | ICD-10-CM

## 2023-11-14 DIAGNOSIS — R91.1 LUNG NODULE: ICD-10-CM

## 2023-11-14 PROBLEM — Z01.818 PRE-OP EXAMINATION: Status: RESOLVED | Noted: 2022-05-16 | Resolved: 2023-11-14

## 2023-11-14 PROCEDURE — 99396 PREV VISIT EST AGE 40-64: CPT | Performed by: FAMILY MEDICINE

## 2023-11-14 NOTE — PROGRESS NOTES
BMI Counseling: Body mass index is 35.7 kg/m². The BMI is above normal. Nutrition recommendations include reducing portion sizes, decreasing overall calorie intake, and 3-5 servings of fruits/vegetables daily. Exercise recommendations include exercising 3-5 times per week.

## 2023-11-14 NOTE — PROGRESS NOTES
Name: Laurel Avila      : 1965      MRN: [id number]  Encounter Provider: Rena Zhang MD  Encounter Date: 2023   Encounter department: [address] [place] [address]     1. Health maintenance examination  Normal exam    2. Impaired fasting glucose  Follow a low carb diet    3. Lung nodule  Repeat CT scan 2024  F/u with Dr. Bonny Silver    Follow up in 1 year      Depression Screening and Follow-up Plan: Patient was screened for depression during today's encounter. They screened negative with a PHQ-2 score of 0. Subjective     Patient is here for a yearly exam.  He had borderline elevated glucose denies any symptoms related to this. Also has a right lower lobe lung nodule, had a biopsy done this year which did not show evidence of cancer. Has a repeat lung Ct scan to be done in 2024. Follows up with Dr. Bonny Silver. Review of Systems   Constitutional:  Negative for activity change, appetite change, fatigue and fever. HENT:  Negative for congestion and ear discharge. Respiratory:  Negative for cough and shortness of breath. Cardiovascular:  Negative for chest pain and palpitations. Gastrointestinal:  Negative for diarrhea and nausea. Musculoskeletal:  Negative for arthralgias and back pain. Skin:  Negative for color change and rash. Neurological:  Negative for dizziness and headaches. Psychiatric/Behavioral:  Negative for agitation and behavioral problems. Past Medical History:   Diagnosis Date    Allergic Almost life long sinus. Penicillin.     Hearing deficit, bilateral     HL (hearing loss) Young age    Hearing aids worn full time    Lumbar herniated disc     S1-L5/L5-L4    Lung nodule     Obesity 2006    Pneumonia     Psoriasis      Past Surgical History:   Procedure Laterality Date    BRONCHOSCOPY N/A 2022    Procedure: BRONCHOSCOPY NAVIGATIONAL;  Surgeon: Erika Ya MD;  [facility];  Service: Thoracic COLONOSCOPY      HERNIA REPAIR      IR BIOPSY LUNG  2022    LIPOMA RESECTION Bilateral     abdomen    PAROTIDECTOMY       [address] INCL FLUOR GDNCE DX W/CELL WASHG SPX N/A 2022    Procedure: Roldan Luz;  Surgeon: Angelic Cruz MD;  [facility];  Service: Thoracic    WISDOM TOOTH EXTRACTION       Family History   Problem Relation Age of Onset    Hypertension Mother     Rheum arthritis Mother     Stroke Father         Heart murmur    Heart murmur Father     Psoriasis Father     Thyroid disease Sister     Breast cancer Sister     Psoriasis Sister     Psoriasis Sister      Social History     Socioeconomic History    Marital status: /Civil Union     Spouse name: None    Number of children: None    Years of education: None    Highest education level: None   Occupational History    None   Tobacco Use    Smoking status: Former     Packs/day: 0.50     Years: 20.00     Total pack years: 10.00     Types: Cigarettes     Start date: 6/15/1983     Quit date: 2003     Years since quittin.8    Smokeless tobacco: Never    Tobacco comments:     Quit in    Vaping Use    Vaping Use: Never used   Substance and Sexual Activity    Alcohol use:  Yes     Alcohol/week: 12.0 standard drinks of alcohol     Types: 8 Cans of beer, 4 Standard drinks or equivalent per week     Comment: "ONLY ON WEEKENDS"    Drug use: No    Sexual activity: Yes     Partners: Female     Birth control/protection: None   Other Topics Concern    None   Social History Narrative    None     Social Determinants of Health     Financial Resource Strain: Not on file   Food Insecurity: Not on file   Transportation Needs: Not on file   Physical Activity: Not on file   Stress: Not on file   Social Connections: Not on file   Intimate Partner Violence: Not on file   Housing Stability: Not on file     Current Outpatient Medications on File Prior to Visit   Medication Sig    CALCIUM PO Take by mouth daily    Capsicum, [place], (CAYENNE PO) Take by mouth in the morning    Coenzyme Q10 (CO Q 10 PO) Take by mouth daily    Ginkgo Biloba 40 MG TABS Take by mouth in the morning    MAGNESIUM PO Take by mouth in the morning    montelukast (SINGULAIR) 10 mg tablet Take 1 tablet (10 mg total) by mouth daily at bedtime    multivitamin (THERAGRAN) TABS Take 1 tablet by mouth in the morning. NIACIN PO Take by mouth daily    NON FORMULARY in the morning DMAE capsule    Omega-3 Fatty Acids (fish oil) 1,000 mg Take 1,000 mg by mouth in the morning. TURMERIC PO Take by mouth    Tyrosine 500 MG CAPS Take by mouth daily     Allergies   Allergen Reactions    Other Blisters     Silk tape    Latex Rash    Penicillins Rash     Immunization History   Administered Date(s) Administered    INFLUENZA 09/30/2009, 10/31/2015, 10/09/2016, 10/08/2017, 11/08/2018, 10/25/2020, 10/31/2021    Influenza, injectable, quadrivalent, preservative free 0.5 mL 10/19/2018    Influenza, recombinant, quadrivalent,injectable, preservative free 10/28/2019    Influenza, seasonal, injectable 09/30/2009    Tdap 07/28/2012, 12/19/2013    Zoster 10/31/2015       Objective     /84 (BP Location: Left arm, Patient Position: Sitting)   Pulse 84   Temp 97.8 °F (36.6 °C) (Temporal)   Ht 5' 10" (1.778 m)   Wt 113 kg (248 lb 12.8 oz)   SpO2 98%   BMI 35.70 kg/m²     Physical Exam  Constitutional:       General: He is not in acute distress. Appearance: He is well-developed. He is not diaphoretic. Eyes:      General: No scleral icterus. Pupils: Pupils are equal, round, and reactive to light. Cardiovascular:      Rate and Rhythm: Normal rate and regular rhythm. Heart sounds: Normal heart sounds. No murmur heard. Pulmonary:      Effort: Pulmonary effort is normal. No respiratory distress. Breath sounds: Normal breath sounds. No wheezing. Abdominal:      General: Bowel sounds are normal. There is no distension. Palpations: Abdomen is soft.       Tenderness: There is no abdominal tenderness. Skin:     General: Skin is warm and dry. Findings: No rash. Neurological:      Mental Status: He is alert and oriented to person, place, and time.        Monster Hollins MD

## 2024-01-13 PROBLEM — Z00.00 HEALTH MAINTENANCE EXAMINATION: Status: RESOLVED | Noted: 2023-11-14 | Resolved: 2024-01-13

## 2024-02-21 PROBLEM — Z12.5 SCREENING FOR PROSTATE CANCER: Status: RESOLVED | Noted: 2018-05-10 | Resolved: 2024-02-21

## 2024-04-04 ENCOUNTER — HOSPITAL ENCOUNTER (OUTPATIENT)
Dept: CT IMAGING | Facility: HOSPITAL | Age: 59
End: 2024-04-04
Payer: COMMERCIAL

## 2024-04-04 DIAGNOSIS — R91.1 LUNG NODULE: ICD-10-CM

## 2024-04-04 PROCEDURE — 71250 CT THORAX DX C-: CPT

## 2024-04-04 PROCEDURE — G1004 CDSM NDSC: HCPCS

## 2024-04-16 ENCOUNTER — OFFICE VISIT (OUTPATIENT)
Dept: CARDIAC SURGERY | Facility: CLINIC | Age: 59
End: 2024-04-16
Payer: COMMERCIAL

## 2024-04-16 VITALS
RESPIRATION RATE: 13 BRPM | SYSTOLIC BLOOD PRESSURE: 120 MMHG | DIASTOLIC BLOOD PRESSURE: 78 MMHG | TEMPERATURE: 98.2 F | HEIGHT: 70 IN | OXYGEN SATURATION: 95 % | HEART RATE: 65 BPM | BODY MASS INDEX: 36.83 KG/M2 | WEIGHT: 257.28 LBS

## 2024-04-16 DIAGNOSIS — R91.1 LUNG NODULE: Primary | ICD-10-CM

## 2024-04-16 PROCEDURE — 99213 OFFICE O/P EST LOW 20 MIN: CPT | Performed by: THORACIC SURGERY (CARDIOTHORACIC VASCULAR SURGERY)

## 2024-04-16 NOTE — ASSESSMENT & PLAN NOTE
Mr. Street returns in surveillance follow-up of a right lower lobe pulmonary nodule that was biopsied and June 2022 with findings of a nonfat containing hamartoma.  Recent CT scan of the chest reviewed with patient and wife to demonstrate the stable and unchanged in size right lower lobe mass measuring 2.3 x 1.7 cm.  There was a discussion had with patient and wife the possibility of further surveillance versus surgical resection of this mass.  We referred that the hamartoma is a benign pathology and previous resection was discussed as this mass appears to be close to the airway.  Surgical resection of this mass was described in discussion with patient and his wife.  Patient electing for surveillance of this mass at this time. CT scan of the chest ordered to be repeated in 1 year.  Patient to return to the office in 1 year after CT scan is completed. Patient and wife in agreement with plan.

## 2024-04-16 NOTE — PROGRESS NOTES
Assessment/Plan:    Lung nodule  Mr. Street returns in surveillance follow-up of a right lower lobe pulmonary nodule that was biopsied and June 2022 with findings of a nonfat containing hamartoma.  Recent CT scan of the chest reviewed with patient and wife to demonstrate the stable and unchanged in size right lower lobe mass measuring 2.3 x 1.7 cm.  There was a discussion had with patient and wife the possibility of further surveillance versus surgical resection of this mass.  We referred that the hamartoma is a benign pathology and previous resection was discussed as this mass appears to be close to the airway.  Surgical resection of this mass was described in discussion with patient and his wife.  Patient electing for surveillance of this mass at this time. CT scan of the chest ordered to be repeated in 1 year.  Patient to return to the office in 1 year after CT scan is completed. Patient and wife in agreement with plan.        Diagnoses and all orders for this visit:    Lung nodule  -     CT chest wo contrast; Future          Thoracic History   Diagnosis: Right lower lobe pulmonary nodule   Procedure: Navigational bronchoscopy with biopsy, EBUS, and BAL by Dr. Cartwright on 5/23/22.  Transthoracic needle biopsy on June 14, 2022  Pathology: Right lower lobe biopsy, bronchial brushings, and washings washings negative for malignancy. Right lower lobe BAL revealed atypical cellular changes seen.   Transthoracic needle biopsy pathology is consistent with a proliferative cartilaginous process.  This was consistent with a non fat containing hamartoma.  There was no evidence of a neuroendocrine pleura variation or carcinoma.    Cancer Staging   No matching staging information was found for the patient.    Oncology History    No history exists.          Subjective:    Patient ID: James Street is a 59 y.o. male.    HPI    James Street is a 59 y.o. male who returns today in follow-up for evaluation of a right lower lobe  pulmonary nodule.  He was evaluated for this nodule in May 2022 with a nondiagnostic navigational bronchoscopy.  Then IR biopsy was pursued in June 2022 revealing a hamartoma without evidence of malignancy. At last office visit on 4/11/2023, patient was offered a right lower lobe superior segmentectomy versus further imaging follow-up with patient electing for repeat CT scan in 12 months.    CT scan of the chest completed on 4/4/2024 was personally reviewed by myself and in PACS demonstrating unchanged smooth margined soft tissue oval mass in the right lower lobe measuring 2.3 x 1.7 cm.     On discussion today, patient denies shortness of breath.  Runs regularly on a treadmill.  Denies any recent lung infections.  Does have postnasal drip at times. Denies cough, fevers, chills, chest pain, new headaches, extremity weakness/numbness, new onset bone or joint pain.      The following portions of the patient's history were reviewed and updated as appropriate: allergies, current medications, past family history, past medical history, past social history, past surgical history and problem list.    Review of Systems   Constitutional:  Negative for chills and fever.   HENT:  Negative for sore throat.    Eyes:         Reports floaters   Respiratory:  Negative for cough and shortness of breath.    Cardiovascular:  Negative for chest pain.   Neurological:  Negative for numbness.         Objective:   Physical Exam  Constitutional:       General: He is not in acute distress.  HENT:      Head: Normocephalic and atraumatic.      Nose: Nose normal.   Eyes:      Extraocular Movements: Extraocular movements intact.   Cardiovascular:      Rate and Rhythm: Normal rate and regular rhythm.   Pulmonary:      Effort: Pulmonary effort is normal.      Breath sounds: Normal breath sounds.   Abdominal:      Palpations: Abdomen is soft.      Tenderness: There is no abdominal tenderness.   Musculoskeletal:      Right lower leg: No edema.       "Left lower leg: No edema.   Skin:     General: Skin is warm and dry.     /78 (BP Location: Right arm, Patient Position: Sitting, Cuff Size: Large)   Pulse 65   Temp 98.2 °F (36.8 °C) (Temporal)   Resp 13   Ht 5' 10\" (1.778 m)   Wt 117 kg (257 lb 4.4 oz)   SpO2 95%   BMI 36.92 kg/m²     No Chest XR results available for this patient.   CT chest wo contrast    Result Date: 4/11/2024  Narrative CT CHEST WITHOUT IV CONTRAST INDICATION: R91.1: Solitary pulmonary nodule. COMPARISON: Compared to 3/28/2023 TECHNIQUE: CT examination of the chest was performed without intravenous contrast. Multiplanar 2D reformatted images were created from the source data. This examination, like all CT scans performed in the [facility], was performed utilizing techniques to minimize radiation dose exposure, including the use of iterative reconstruction and automated exposure control. Radiation dose length product (DLP) for this visit: 546 mGy-cm FINDINGS: LUNGS: Unchanged smooth margined soft tissue oval mass in the right lower lobe measuring 2.3 x 1.7 cm. Lungs are otherwise clear. There is no tracheal or endobronchial lesion. PLEURA: Unremarkable. HEART/GREAT VESSELS: Heart is unremarkable for patient's age. No thoracic aortic aneurysm. MEDIASTINUM AND WILLIAMS: Unremarkable. CHEST WALL AND LOWER NECK: Unremarkable. VISUALIZED STRUCTURES IN THE UPPER ABDOMEN: Gastric fundal diverticulum. Tiny gallstones in the gallbladder.. OSSEOUS STRUCTURES: No acute fracture or destructive osseous lesion.     Impression Unchanged smooth margined right lower lung mass measuring 2.3 x 1.7 cm. This was biopsied on 6/14/2022 with pathology suggestive for hamartoma. Workstation performed: [id number]     No CT Chest,Abdomen,Pelvis results available for this patient.   No NM PET CT results available for this patient.   No Barium Swallow results available for this patient.    "

## 2024-05-02 DIAGNOSIS — Z00.6 ENCOUNTER FOR EXAMINATION FOR NORMAL COMPARISON OR CONTROL IN CLINICAL RESEARCH PROGRAM: ICD-10-CM

## 2024-05-03 ENCOUNTER — APPOINTMENT (OUTPATIENT)
Dept: LAB | Facility: HOSPITAL | Age: 59
End: 2024-05-03

## 2024-05-03 DIAGNOSIS — Z00.6 ENCOUNTER FOR EXAMINATION FOR NORMAL COMPARISON OR CONTROL IN CLINICAL RESEARCH PROGRAM: ICD-10-CM

## 2024-05-03 PROCEDURE — 36415 COLL VENOUS BLD VENIPUNCTURE: CPT

## 2024-05-13 DIAGNOSIS — J30.1 ALLERGIC RHINITIS DUE TO POLLEN, UNSPECIFIED SEASONALITY: ICD-10-CM

## 2024-05-14 RX ORDER — MONTELUKAST SODIUM 10 MG/1
10 TABLET ORAL
Qty: 90 TABLET | Refills: 1 | Status: SHIPPED | OUTPATIENT
Start: 2024-05-14

## 2024-05-19 LAB
APOB+LDLR+PCSK9 GENE MUT ANL BLD/T: NOT DETECTED
BRCA1+BRCA2 DEL+DUP + FULL MUT ANL BLD/T: NOT DETECTED
MLH1+MSH2+MSH6+PMS2 GN DEL+DUP+FUL M: NOT DETECTED

## 2024-06-13 ENCOUNTER — OFFICE VISIT (OUTPATIENT)
Dept: GASTROENTEROLOGY | Facility: CLINIC | Age: 59
End: 2024-06-13
Payer: COMMERCIAL

## 2024-06-13 VITALS
TEMPERATURE: 97.4 F | SYSTOLIC BLOOD PRESSURE: 126 MMHG | WEIGHT: 261 LBS | DIASTOLIC BLOOD PRESSURE: 81 MMHG | BODY MASS INDEX: 37.37 KG/M2 | OXYGEN SATURATION: 98 % | HEIGHT: 70 IN | HEART RATE: 62 BPM

## 2024-06-13 DIAGNOSIS — Z86.010 HISTORY OF COLON POLYPS: Primary | ICD-10-CM

## 2024-06-13 DIAGNOSIS — K62.5 RECTAL BLEEDING: ICD-10-CM

## 2024-06-13 PROCEDURE — 99203 OFFICE O/P NEW LOW 30 MIN: CPT | Performed by: INTERNAL MEDICINE

## 2024-06-13 NOTE — PATIENT INSTRUCTIONS
Scheduled date of colonoscopy (as of today):7/15/24  Physician performing colonoscopy:Katey  Location of colonoscopy:[place]  Bowel prep reviewed with patient:Ashu/Miralax  Instructions reviewed with patient by:Rajat wayne  Clearances:  none

## 2024-06-13 NOTE — PROGRESS NOTES
[facility] - Outpatient Consultation  James Street 59 y.o. male MRN: [id number]  Encounter: [id number]          ASSESSMENT AND PLAN:      1. History of colon polyps  - Colonoscopy; Future    2. Rectal bleeding    ______________________________________________________________________    HPI: Garland is a 59-year-old male who comes the office today to schedule a routine screening colonoscopy.  His last colonoscopy was performed 11 years ago.  At that time he was found to have 1 or 2 polyps.  He was also found to have diverticular disease.  He admits to having bloody stools 3 weeks ago that lasted for 2 days.  It was of bright red blood.  There is no family history for colon cancer or for colon polyp.  Patient denies any problems with the colonoscopy 11 years ago relative to the anesthesia, the bowel prep, or the actual procedure.  The patient denies abdominal pain, diarrhea, constipation, bloating, gaseousness, heartburn, dysphagia, odynophagia.      REVIEW OF SYSTEMS:    CONSTITUTIONAL: Denies any fever, chills, rigors, and weight loss.  HEENT: No earache or tinnitus. Denies hearing loss or visual disturbances.  CARDIOVASCULAR: No chest pain or palpitations.   RESPIRATORY: Denies any cough, hemoptysis, shortness of breath or dyspnea on exertion.  GASTROINTESTINAL: As noted in the History of Present Illness.   GENITOURINARY: No problems with urination. Denies any hematuria or dysuria.  NEUROLOGIC: No dizziness or vertigo, denies headaches.   MUSCULOSKELETAL: Denies any muscle or joint pain.   SKIN: Denies skin rashes or itching.   ENDOCRINE: Denies excessive thirst. Denies intolerance to heat or cold.  PSYCHOSOCIAL: Denies depression or anxiety. Denies any recent memory loss.       Historical Information   Past Medical History:   Diagnosis Date    Allergic Almost life long sinus. Penicillin.    Hearing deficit, bilateral     HL (hearing loss) Young age    Hearing aids worn full time     "Lumbar herniated disc     S1-L5/L5-L4    Lung nodule     Obesity 2006    Pneumonia     Psoriasis      Past Surgical History:   Procedure Laterality Date    BRONCHOSCOPY N/A 2022    Procedure: BRONCHOSCOPY NAVIGATIONAL;  Surgeon: Best Cartwright MD;  Location: [facility];  Service: Thoracic    COLONOSCOPY      HERNIA REPAIR      IR BIOPSY LUNG  2022    LIPOMA RESECTION Bilateral     abdomen    PAROTIDECTOMY      HI BRNCHSC INCL FLUOR GDNCE DX W/CELL WASHG SPX N/A 2022    Procedure: BRONCHOSCOPY FLEXIBLE;  Surgeon: Best Cartwright MD;  Location: [facility];  Service: Thoracic    WISDOM TOOTH EXTRACTION       Social History   Social History     Substance and Sexual Activity   Alcohol Use Yes    Alcohol/week: 12.0 standard drinks of alcohol    Types: 8 Cans of beer, 4 Standard drinks or equivalent per week    Comment: \"ONLY ON WEEKENDS\"     Social History     Substance and Sexual Activity   Drug Use No     Social History     Tobacco Use   Smoking Status Former    Current packs/day: 0.00    Average packs/day: 0.5 packs/day for 20.0 years (10.0 ttl pk-yrs)    Types: Cigarettes    Start date: 6/15/1983    Quit date: 2003    Years since quittin.4   Smokeless Tobacco Never   Tobacco Comments    Quit in      Family History   Problem Relation Age of Onset    Hypertension Mother     Rheum arthritis Mother     Arthritis Mother     Stroke Father         Heart murmur    Heart murmur Father     Psoriasis Father     Thyroid disease Sister     Breast cancer Sister     Psoriasis Sister     Breast cancer Sister     Psoriasis Sister     Breast cancer Sister        Meds/Allergies       Current Outpatient Medications:     CALCIUM PO    Capsicum, Cayenne, (CAYENNE PO)    Coenzyme Q10 (CO Q 10 PO)    Ginkgo Biloba 40 MG TABS    MAGNESIUM PO    montelukast (SINGULAIR) 10 mg tablet    multivitamin (THERAGRAN) TABS    NIACIN PO    NON FORMULARY    Omega-3 Fatty Acids (fish oil) 1,000 mg    TURMERIC PO    " "Tyrosine 500 MG CAPS    Allergies   Allergen Reactions    Other Blisters     Silk tape    Latex Rash    Penicillins Rash           Objective     Blood pressure 126/81, pulse 62, temperature (!) 97.4 °F (36.3 °C), temperature source Tympanic, height 5' 10\" (1.778 m), weight 118 kg (261 lb), SpO2 98%. Body mass index is 37.45 kg/m².        PHYSICAL EXAM:      General Appearance:   Alert, cooperative, no distress   HEENT:   Normocephalic, atraumatic, anicteric.     Neck:  Supple, symmetrical, trachea midline   Lungs:   Clear to auscultation bilaterally; no rales, rhonchi or wheezing; respirations unlabored    Heart::   Regular rate and rhythm; no murmur, rub, or gallop.   Abdomen:   Soft, non-tender, non-distended; normal bowel sounds; no masses, no organomegaly    Genitalia:   Deferred    Rectal:   Deferred    Extremities:  No cyanosis, clubbing or edema    Pulses:  2+ and symmetric    Skin:  No jaundice, rashes, or lesions    Lymph nodes:  No palpable cervical lymphadenopathy        Lab Results:   No visits with results within 1 Day(s) from this visit.   Latest known visit with results is:   Appointment on 05/03/2024   Component Date Value    DESOUZA SYNDROME DNA KRISTI* 05/03/2024 Not Detected     HEREDITARY BREAST & OVAR* 05/03/2024 Not Detected     FAMILIAL HYPERCHOLESTERO* 05/03/2024 Not Detected          Radiology Results:   No results found.  "

## 2024-06-25 ENCOUNTER — TELEPHONE (OUTPATIENT)
Dept: GASTROENTEROLOGY | Facility: CLINIC | Age: 59
End: 2024-06-25

## 2024-06-25 NOTE — TELEPHONE ENCOUNTER
Pt called to r/s colonoscopy. Colonoscopy r/s for 8/19 with Dr. Del Toro at [facility].

## 2024-07-24 ENCOUNTER — VBI (OUTPATIENT)
Dept: ADMINISTRATIVE | Facility: OTHER | Age: 59
End: 2024-07-24

## 2024-07-24 NOTE — TELEPHONE ENCOUNTER
07/24/24 2:17 PM     Chart reviewed for CRC: Colonoscopy was/were not submitted to the patient's insurance.     Emilie Fox   [facility]

## 2024-08-19 ENCOUNTER — HOSPITAL ENCOUNTER (OUTPATIENT)
Dept: GASTROENTEROLOGY | Facility: HOSPITAL | Age: 59
Setting detail: OUTPATIENT SURGERY
Discharge: HOME | End: 2024-08-19
Attending: INTERNAL MEDICINE
Payer: COMMERCIAL

## 2024-08-19 ENCOUNTER — ANESTHESIA (OUTPATIENT)
Dept: GASTROENTEROLOGY | Facility: HOSPITAL | Age: 59
End: 2024-08-19

## 2024-08-19 ENCOUNTER — ANESTHESIA EVENT (OUTPATIENT)
Dept: GASTROENTEROLOGY | Facility: HOSPITAL | Age: 59
End: 2024-08-19

## 2024-08-19 VITALS
HEIGHT: 70 IN | HEART RATE: 61 BPM | OXYGEN SATURATION: 97 % | SYSTOLIC BLOOD PRESSURE: 110 MMHG | WEIGHT: 255.73 LBS | TEMPERATURE: 97.5 F | BODY MASS INDEX: 36.61 KG/M2 | DIASTOLIC BLOOD PRESSURE: 70 MMHG | RESPIRATION RATE: 20 BRPM

## 2024-08-19 DIAGNOSIS — Z86.010 HISTORY OF COLON POLYPS: ICD-10-CM

## 2024-08-19 PROCEDURE — 88305 TISSUE EXAM BY PATHOLOGIST: CPT | Performed by: PATHOLOGY

## 2024-08-19 PROCEDURE — 45385 COLONOSCOPY W/LESION REMOVAL: CPT | Performed by: INTERNAL MEDICINE

## 2024-08-19 PROCEDURE — 45380 COLONOSCOPY AND BIOPSY: CPT | Performed by: INTERNAL MEDICINE

## 2024-08-19 RX ORDER — SODIUM CHLORIDE, SODIUM LACTATE, POTASSIUM CHLORIDE, CALCIUM CHLORIDE 600; 310; 30; 20 MG/100ML; MG/100ML; MG/100ML; MG/100ML
INJECTION, SOLUTION INTRAVENOUS CONTINUOUS PRN
Status: DISCONTINUED | OUTPATIENT
Start: 2024-08-19 | End: 2024-08-19

## 2024-08-19 RX ORDER — PROPOFOL 10 MG/ML
INJECTION, EMULSION INTRAVENOUS AS NEEDED
Status: DISCONTINUED | OUTPATIENT
Start: 2024-08-19 | End: 2024-08-19

## 2024-08-19 RX ORDER — LIDOCAINE HYDROCHLORIDE 10 MG/ML
INJECTION, SOLUTION EPIDURAL; INFILTRATION; INTRACAUDAL; PERINEURAL AS NEEDED
Status: DISCONTINUED | OUTPATIENT
Start: 2024-08-19 | End: 2024-08-19

## 2024-08-19 RX ADMIN — SODIUM CHLORIDE, SODIUM LACTATE, POTASSIUM CHLORIDE, AND CALCIUM CHLORIDE: .6; .31; .03; .02 INJECTION, SOLUTION INTRAVENOUS at 08:40

## 2024-08-19 RX ADMIN — PROPOFOL 30 MG: 10 INJECTION, EMULSION INTRAVENOUS at 08:52

## 2024-08-19 RX ADMIN — PROPOFOL 40 MG: 10 INJECTION, EMULSION INTRAVENOUS at 08:48

## 2024-08-19 RX ADMIN — PROPOFOL 100 MG: 10 INJECTION, EMULSION INTRAVENOUS at 08:42

## 2024-08-19 RX ADMIN — PROPOFOL 20 MG: 10 INJECTION, EMULSION INTRAVENOUS at 08:46

## 2024-08-19 RX ADMIN — LIDOCAINE HYDROCHLORIDE 50 MG: 10 INJECTION, SOLUTION EPIDURAL; INFILTRATION; INTRACAUDAL; PERINEURAL at 08:42

## 2024-08-19 RX ADMIN — PROPOFOL 20 MG: 10 INJECTION, EMULSION INTRAVENOUS at 08:45

## 2024-08-19 RX ADMIN — PROPOFOL 30 MG: 10 INJECTION, EMULSION INTRAVENOUS at 08:54

## 2024-08-19 RX ADMIN — PROPOFOL 20 MG: 10 INJECTION, EMULSION INTRAVENOUS at 08:43

## 2024-08-19 RX ADMIN — PROPOFOL 30 MG: 10 INJECTION, EMULSION INTRAVENOUS at 08:50

## 2024-08-19 NOTE — ANESTHESIA PREPROCEDURE EVALUATION
Procedure:  COLONOSCOPY    Relevant Problems   CARDIO   (+) Mixed hyperlipidemia      PULMONARY   (+) ANNABEL (obstructive sleep apnea)        Physical Exam    Airway    Mallampati score: II  TM Distance: >3 FB  Neck ROM: full     Dental   No notable dental hx     Cardiovascular  Rhythm: regular, No weak pulses    Pulmonary   No stridor    Other Findings        Anesthesia Plan  ASA Score- 2     Anesthesia Type- IV sedation with anesthesia with ASA Monitors.         Additional Monitors:     Airway Plan:            Plan Factors-    Chart reviewed. EKG reviewed.  Existing labs reviewed. Patient summary reviewed.                  Induction- intravenous.    Postoperative Plan-         Informed Consent- Anesthetic plan and risks discussed with patient.  I personally reviewed this patient with the CRNA. Discussed and agreed on the Anesthesia Plan with the CRNA..

## 2024-08-19 NOTE — ANESTHESIA POSTPROCEDURE EVALUATION
Post-Op Assessment Note    CV Status:  Stable  Pain Score: 0    Pain management: adequate       Mental Status:  Alert and awake   Hydration Status:  Euvolemic   PONV Controlled:  Controlled   Airway Patency:  Patent     Post Op Vitals Reviewed: Yes    No anethesia notable event occurred.    Staff: CRNA               BP   103/54   Temp      Pulse  64   Resp   14   SpO2   95

## 2024-08-23 PROCEDURE — 88305 TISSUE EXAM BY PATHOLOGIST: CPT | Performed by: PATHOLOGY

## 2024-10-13 DIAGNOSIS — J30.1 ALLERGIC RHINITIS DUE TO POLLEN, UNSPECIFIED SEASONALITY: ICD-10-CM

## 2024-10-15 RX ORDER — MONTELUKAST SODIUM 10 MG/1
10 TABLET ORAL
Qty: 90 TABLET | Refills: 1 | Status: SHIPPED | OUTPATIENT
Start: 2024-10-15

## 2025-01-28 DIAGNOSIS — E78.2 MIXED HYPERLIPIDEMIA: ICD-10-CM

## 2025-01-28 DIAGNOSIS — Z12.5 SCREENING FOR PROSTATE CANCER: ICD-10-CM

## 2025-01-28 DIAGNOSIS — R73.01 IMPAIRED FASTING GLUCOSE: Primary | ICD-10-CM

## 2025-04-10 ENCOUNTER — HOSPITAL ENCOUNTER (OUTPATIENT)
Dept: CT IMAGING | Facility: HOSPITAL | Age: 60
End: 2025-04-10
Payer: COMMERCIAL

## 2025-04-10 DIAGNOSIS — R91.1 LUNG NODULE: ICD-10-CM

## 2025-04-10 PROCEDURE — 71250 CT THORAX DX C-: CPT

## 2025-04-22 ENCOUNTER — RA CDI HCC (OUTPATIENT)
Dept: OTHER | Facility: HOSPITAL | Age: 60
End: 2025-04-22

## 2025-04-25 ENCOUNTER — APPOINTMENT (OUTPATIENT)
Dept: LAB | Facility: CLINIC | Age: 60
End: 2025-04-25
Payer: COMMERCIAL

## 2025-04-25 DIAGNOSIS — E78.2 MIXED HYPERLIPIDEMIA: ICD-10-CM

## 2025-04-25 DIAGNOSIS — Z12.5 SCREENING FOR PROSTATE CANCER: ICD-10-CM

## 2025-04-25 DIAGNOSIS — R73.01 IMPAIRED FASTING GLUCOSE: ICD-10-CM

## 2025-04-25 LAB
ALBUMIN SERPL BCG-MCNC: 4.3 G/DL (ref 3.5–5)
ALP SERPL-CCNC: 59 U/L (ref 34–104)
ALT SERPL W P-5'-P-CCNC: 32 U/L (ref 7–52)
ANION GAP SERPL CALCULATED.3IONS-SCNC: 10 MMOL/L (ref 4–13)
AST SERPL W P-5'-P-CCNC: 30 U/L (ref 13–39)
BILIRUB SERPL-MCNC: 0.95 MG/DL (ref 0.2–1)
BUN SERPL-MCNC: 14 MG/DL (ref 5–25)
CALCIUM SERPL-MCNC: 9.3 MG/DL (ref 8.4–10.2)
CHLORIDE SERPL-SCNC: 107 MMOL/L (ref 96–108)
CHOLEST SERPL-MCNC: 185 MG/DL (ref ?–200)
CO2 SERPL-SCNC: 23 MMOL/L (ref 21–32)
CREAT SERPL-MCNC: 0.96 MG/DL (ref 0.6–1.3)
EST. AVERAGE GLUCOSE BLD GHB EST-MCNC: 120 MG/DL
GFR SERPL CREATININE-BSD FRML MDRD: 84 ML/MIN/1.73SQ M
GLUCOSE P FAST SERPL-MCNC: 131 MG/DL (ref 65–99)
HBA1C MFR BLD: 5.8 %
HDLC SERPL-MCNC: 48 MG/DL
LDLC SERPL CALC-MCNC: 110 MG/DL (ref 0–100)
NONHDLC SERPL-MCNC: 137 MG/DL
POTASSIUM SERPL-SCNC: 4.2 MMOL/L (ref 3.5–5.3)
PROT SERPL-MCNC: 7.1 G/DL (ref 6.4–8.4)
PSA SERPL-MCNC: 0.93 NG/ML (ref 0–4)
SODIUM SERPL-SCNC: 140 MMOL/L (ref 135–147)
TRIGL SERPL-MCNC: 137 MG/DL (ref ?–150)

## 2025-04-25 PROCEDURE — G0103 PSA SCREENING: HCPCS

## 2025-04-25 PROCEDURE — 80061 LIPID PANEL: CPT

## 2025-04-25 PROCEDURE — 36415 COLL VENOUS BLD VENIPUNCTURE: CPT

## 2025-04-25 PROCEDURE — 80053 COMPREHEN METABOLIC PANEL: CPT

## 2025-04-25 PROCEDURE — 83036 HEMOGLOBIN GLYCOSYLATED A1C: CPT

## 2025-04-27 ENCOUNTER — RESULTS FOLLOW-UP (OUTPATIENT)
Dept: FAMILY MEDICINE CLINIC | Facility: CLINIC | Age: 60
End: 2025-04-27

## 2025-04-29 ENCOUNTER — OFFICE VISIT (OUTPATIENT)
Dept: CARDIAC SURGERY | Facility: CLINIC | Age: 60
End: 2025-04-29
Payer: COMMERCIAL

## 2025-04-29 ENCOUNTER — OFFICE VISIT (OUTPATIENT)
Dept: FAMILY MEDICINE CLINIC | Facility: CLINIC | Age: 60
End: 2025-04-29
Payer: COMMERCIAL

## 2025-04-29 VITALS
DIASTOLIC BLOOD PRESSURE: 68 MMHG | HEART RATE: 64 BPM | TEMPERATURE: 98 F | HEIGHT: 70 IN | SYSTOLIC BLOOD PRESSURE: 120 MMHG | BODY MASS INDEX: 39.45 KG/M2 | WEIGHT: 275.57 LBS | OXYGEN SATURATION: 98 %

## 2025-04-29 VITALS
BODY MASS INDEX: 39.34 KG/M2 | SYSTOLIC BLOOD PRESSURE: 122 MMHG | DIASTOLIC BLOOD PRESSURE: 82 MMHG | HEART RATE: 58 BPM | WEIGHT: 274.8 LBS | TEMPERATURE: 97.8 F | OXYGEN SATURATION: 96 % | HEIGHT: 70 IN

## 2025-04-29 DIAGNOSIS — J30.1 ALLERGIC RHINITIS DUE TO POLLEN, UNSPECIFIED SEASONALITY: ICD-10-CM

## 2025-04-29 DIAGNOSIS — R73.01 IMPAIRED FASTING GLUCOSE: Primary | ICD-10-CM

## 2025-04-29 DIAGNOSIS — R91.1 NODULE OF LOWER LOBE OF RIGHT LUNG: Primary | ICD-10-CM

## 2025-04-29 DIAGNOSIS — Z00.00 ANNUAL PHYSICAL EXAM: ICD-10-CM

## 2025-04-29 DIAGNOSIS — L40.9 PSORIASIS: ICD-10-CM

## 2025-04-29 DIAGNOSIS — Q85.9: ICD-10-CM

## 2025-04-29 PROCEDURE — 99396 PREV VISIT EST AGE 40-64: CPT | Performed by: FAMILY MEDICINE

## 2025-04-29 PROCEDURE — 99213 OFFICE O/P EST LOW 20 MIN: CPT | Performed by: FAMILY MEDICINE

## 2025-04-29 PROCEDURE — 99213 OFFICE O/P EST LOW 20 MIN: CPT | Performed by: THORACIC SURGERY (CARDIOTHORACIC VASCULAR SURGERY)

## 2025-04-29 RX ORDER — MONTELUKAST SODIUM 10 MG/1
10 TABLET ORAL
Qty: 90 TABLET | Refills: 3 | Status: SHIPPED | OUTPATIENT
Start: 2025-04-29

## 2025-04-29 NOTE — PROGRESS NOTES
Name: James Street      : 1965      MRN: [id number]  Encounter Provider: Dirk Sexton MD  Encounter Date: 2025   Encounter department: [facility] [place]  :  Assessment & Plan  Nodule of lower lobe of right lung  Mr. 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  His imaging does not demonstrate any new nodules or compression of airways on his CT scan.  He would like to continue to monitor this radiographically so we will see him back in 12 months with a repeat noncontrasted CT scan of the chest.  He knows that he can call the office at anytime with questions or concerns.    Orders:    CT chest wo contrast; Future        Thoracic History   Diagnosis: Right lower lobe pulmonary nodule   Procedures/Surgeries: Navigational bronchoscopy with biopsy, EBUS, and BAL by Dr. Cartwright on 22.  Transthoracic needle biopsy on 2022  Pathology: Right lower lobe biopsy, bronchial brushings, and washings washings negative for malignancy. Right lower lobe BAL revealed atypical cellular changes seen. Transthoracic needle biopsy pathology is consistent with a proliferative cartilaginous process.  This was consistent with a non fat containing hamartoma.  There was no evidence of a neuroendocrine pleura variation or carcinoma.    Problem   Nodule of Lower Lobe of Right Lung    Diagnosis: Right lower lobe pulmonary nodule   Procedure: Navigational bronchoscopy with biopsy, EBUS, and BAL by Dr. Cartwright on 22.  Transthoracic needle biopsy on 2022  Pathology: Right lower lobe biopsy, bronchial brushings, and washings washings negative for malignancy. Right lower lobe BAL revealed atypical cellular changes seen.   Transthoracic needle biopsy pathology is consistent with a proliferative cartilaginous process.  This was consistent with a non fat containing hamartoma.  There was no evidence of a neuroendocrine pleura variation or  "carcinoma.            History of Present Illness   HPI  James Street is a 60 y.o. male who returns the office for routine lung nodule follow-up.  We have been following this right lower lobe nodule since 2022.  He had a nondiagnostic navigational bronchoscopy in 2022 followed by an IR biopsy that revealed pathology consistent with a hamartoma.  He underwent a CT scan of the chest on April 10, 2025 and this is personally reviewed.  This demonstrates a 2.3 x 1.8 cm well-circumscribed right lower lobe lung mass.  This mass has grown approximately 3 mm in 3 years.  There are no new pulmonary nodules nor are there any concerning mediastinal or hilar lymph nodes.    Mr. Street has had no major intercurrent medical illnesses since last being seen.  He has gained a little bit of weight.  He denies fever, chills, chest pain, cough, purulent sputum production, or hemoptysis.  He has been dealing with some hemorrhoids.    Review of Systems   Constitutional:  Negative for activity change, appetite change, chills, fever and unexpected weight change.   HENT:  Negative for voice change.    Respiratory:  Negative for cough, shortness of breath and wheezing.    Cardiovascular:  Negative for chest pain, palpitations and leg swelling.   Gastrointestinal:  Positive for rectal pain (Hemorrhoids). Negative for abdominal pain, constipation, diarrhea, nausea and vomiting.   Musculoskeletal:  Negative for arthralgias and myalgias.   Skin:  Negative for rash.   Neurological:  Negative for dizziness, seizures, weakness, numbness and headaches.   Hematological:  Negative for adenopathy.   Psychiatric/Behavioral:  Negative for confusion.            Objective   /68   Pulse 64   Temp 98 °F (36.7 °C)   Ht 5' 10\" (1.778 m)   Wt 125 kg (275 lb 9.2 oz)   SpO2 98%   BMI 39.54 kg/m²     Pain Screening:  Pain Score: 0-No pain  ECOG ECOG Performance Status: 0 - Fully active, able to carry on all pre-disease performance without " restriction  Physical Exam  Vitals reviewed.   Constitutional:       General: He is not in acute distress.     Appearance: Normal appearance. He is well-developed.   HENT:      Head: Normocephalic and atraumatic.   Eyes:      General: No scleral icterus.     Conjunctiva/sclera: Conjunctivae normal.      Pupils: Pupils are equal, round, and reactive to light.   Neck:      Trachea: No tracheal deviation.   Cardiovascular:      Rate and Rhythm: Normal rate and regular rhythm.      Heart sounds: Normal heart sounds.   Pulmonary:      Effort: Pulmonary effort is normal. No respiratory distress.      Breath sounds: Normal breath sounds. No wheezing or rales.   Abdominal:      General: Bowel sounds are normal. There is no distension.      Palpations: Abdomen is soft.      Tenderness: There is no abdominal tenderness.   Musculoskeletal:      Cervical back: Normal range of motion and neck supple.      Right lower leg: No edema.      Left lower leg: No edema.   Lymphadenopathy:      Cervical: No cervical adenopathy.   Skin:     General: Skin is warm and dry.   Neurological:      Mental Status: He is alert and oriented to person, place, and time.      Cranial Nerves: No cranial nerve deficit.   Psychiatric:         Behavior: Behavior normal.         Thought Content: Thought content normal.         Judgment: Judgment normal.         Labs:     Radiology Results Review : I have reviewed images/report studies in PACS as described above (in the HPI).  Pathology: I have reviewed pathology reports described above.

## 2025-04-29 NOTE — PATIENT INSTRUCTIONS
"Patient Education     Routine physical for adults   The Basics   Written by the doctors and editors at [facility]   What is a physical? -- A physical is a routine visit, or \"check-up,\" with your doctor. You might also hear it called a \"wellness visit\" or \"preventive visit.\"  During each visit, the doctor will:   Ask about your physical and mental health   Ask about your habits, behaviors, and lifestyle   Do an exam   Give you vaccines if needed   Talk to you about any medicines you take   Give advice about your health   Answer your questions  Getting regular check-ups is an important part of taking care of your health. It can help your doctor find and treat any problems you have. But it's also important for preventing health problems.  A routine physical is different from a \"sick visit.\" A sick visit is when you see a doctor because of a health concern or problem. Since physicals are scheduled ahead of time, you can think about what you want to ask the doctor.  How often should I get a physical? -- It depends on your age and health. In general, for people age 21 years and older:   If you are younger than 50 years, you might be able to get a physical every 3 years.   If you are 50 years or older, your doctor might recommend a physical every year.  If you have an ongoing health condition, like diabetes or high blood pressure, your doctor will probably want to see you more often.  What happens during a physical? -- In general, each visit will include:   Physical exam - The doctor or nurse will check your height, weight, heart rate, and blood pressure. They will also look at your eyes and ears. They will ask about how you are feeling and whether you have any symptoms that bother you.   Medicines - It's a good idea to bring a list of all the medicines you take to each doctor visit. Your doctor will talk to you about your medicines and answer any questions. Tell them if you are having any side effects that bother you. You " "should also tell them if you are having trouble paying for any of your medicines.   Habits and behaviors - This includes:   Your diet   Your exercise habits   Whether you smoke, drink alcohol, or use drugs   Whether you are sexually active   Whether you feel safe at home  Your doctor will talk to you about things you can do to improve your health and lower your risk of health problems. They will also offer help and support. For example, if you want to quit smoking, they can give you advice and might prescribe medicines. If you want to improve your diet or get more physical activity, they can help you with this, too.   Lab tests, if needed - The tests you get will depend on your age and situation. For example, your doctor might want to check your:   Cholesterol   Blood sugar   Iron level   Vaccines - The recommended vaccines will depend on your age, health, and what vaccines you already had. Vaccines are very important because they can prevent certain serious or deadly infections.   Discussion of screening - \"Screening\" means checking for diseases or other health problems before they cause symptoms. Your doctor can recommend screening based on your age, risk, and preferences. This might include tests to check for:   Cancer, such as breast, prostate, cervical, ovarian, colorectal, prostate, lung, or skin cancer   Sexually transmitted infections, such as chlamydia and gonorrhea   Mental health conditions like depression and anxiety  Your doctor will talk to you about the different types of screening tests. They can help you decide which screenings to have. They can also explain what the results might mean.   Answering questions - The physical is a good time to ask the doctor or nurse questions about your health. If needed, they can refer you to other doctors or specialists, too.  Adults older than 65 years often need other care, too. As you get older, your doctor will talk to you about:   How to prevent falling at " home   Hearing or vision tests   Memory testing   How to take your medicines safely   Making sure that you have the help and support you need at home  All topics are updated as new evidence becomes available and our peer review process is complete.  This topic retrieved from [employer] on: May 02, 2024.  Topic 949085 Version 1.0  Release: 32.4.3 - C32.122  © 2024 UpToDate, Inc. and/or its affiliates. All rights reserved.  Consumer Information Use and Disclaimer   Disclaimer: This generalized information is a limited summary of diagnosis, treatment, and/or medication information. It is not meant to be comprehensive and should be used as a tool to help the user understand and/or assess potential diagnostic and treatment options. It does NOT include all information about conditions, treatments, medications, side effects, or risks that may apply to a specific patient. It is not intended to be medical advice or a substitute for the medical advice, diagnosis, or treatment of a health care provider based on the health care provider's examination and assessment of a patient's specific and unique circumstances. Patients must speak with a health care provider for complete information about their health, medical questions, and treatment options, including any risks or benefits regarding use of medications. This information does not endorse any treatments or medications as safe, effective, or approved for treating a specific patient. UpToDate, Inc. and its affiliates disclaim any warranty or liability relating to this information or the use thereof.The use of this information is governed by the Terms of Use, available at https://www.[employer] Mediau[employer].com/en/know/clinical-effectiveness-terms. 2024© UpToDate, Inc. and its affiliates and/or licensors. All rights reserved.  Copyright   © 2024 UpToDate, Inc. and/or its affiliates. All rights reserved.

## 2025-04-29 NOTE — PROGRESS NOTES
Adult Annual Physical  Name: James Street      : 1965      MRN: [id number]  Encounter Provider: Wily Piper MD  Encounter Date: 2025   Encounter department: [facility]    :  Assessment & Plan  Impaired fasting glucose         Allergic rhinitis due to pollen, unspecified seasonality    Orders:  •  montelukast (SINGULAIR) 10 mg tablet; Take 1 tablet (10 mg total) by mouth daily at bedtime    Phakomatosis, unspecified (HCC)         Psoriasis         Annual physical exam  See above otherwise normal exam    Follow up in 1 year           Preventive Screenings:  - Diabetes Screening: screening up-to-date  - Cholesterol Screening: has hyperlipidemia and screening up-to-date   - Hepatitis C screening: screening up-to-date   - HIV screening: screening not indicated   - Colon cancer screening: screening up-to-date   - Lung cancer screening: screening not indicated   - Prostate cancer screening: screening up-to-date     Immunizations:  - Immunizations due: Influenza and Zoster (Shingrix)         History of Present Illness     Adult Annual Physical:  Patient presents for annual physical.     Diet and Physical Activity:  - Diet/Nutrition: well balanced diet.  - Exercise: walking and 5-7 times a week on average.    Depression Screening:  - PHQ-2 Score: 0    General Health:  - Sleep: sleeps well and 7-8 hours of sleep on average.  - Hearing: normal hearing bilateral ears.  - Vision: wears glasses.  - Dental: regular dental visits.    Review of Systems   Constitutional:  Negative for activity change, appetite change, fatigue and fever.   HENT:  Negative for congestion and ear discharge.    Respiratory:  Negative for cough and shortness of breath.    Cardiovascular:  Negative for chest pain and palpitations.   Gastrointestinal:  Negative for diarrhea and nausea.   Musculoskeletal:  Negative for arthralgias and back pain.   Skin:  Negative for color change and rash.   Neurological:  Negative for  dizziness and headaches.   Psychiatric/Behavioral:  Negative for agitation and behavioral problems.      Pertinent Medical History         Medical History Reviewed by provider this encounter:  Tobacco  Allergies  Meds  Problems  Med Hx  Surg Hx  Fam Hx     .  Past Medical History   Past Medical History:   Diagnosis Date   • Allergic Almost life long sinus. Penicillin.   • Hearing deficit, bilateral    • HL (hearing loss) Young age    Hearing aids worn full time   • Lumbar herniated disc     S1-L5/L5-L4   • Lung nodule    • Obesity 2006   • Pneumonia    • Psoriasis      Past Surgical History:   Procedure Laterality Date   • BRONCHOSCOPY N/A 5/23/2022    Procedure: BRONCHOSCOPY NAVIGATIONAL;  Surgeon: Best Cartwright MD;  Location: [facility];  Service: Thoracic   • COLONOSCOPY     • HERNIA REPAIR     • IR BIOPSY LUNG  6/14/2022   • LIPOMA RESECTION Bilateral     abdomen   • PAROTIDECTOMY     • VA BRNCHSC INCL FLUOR GDNCE DX W/CELL WASHG SPX N/A 5/23/2022    Procedure: BRONCHOSCOPY FLEXIBLE;  Surgeon: Best Cartwright MD;  Location: [facility];  Service: Thoracic   • WISDOM TOOTH EXTRACTION       Family History   Problem Relation Age of Onset   • Hypertension Mother    • Rheum arthritis Mother    • Arthritis Mother    • Stroke Father         Heart murmur   • Heart murmur Father    • Psoriasis Father    • Thyroid disease Sister    • Breast cancer Sister    • Psoriasis Sister    • Breast cancer Sister    • Psoriasis Sister    • Breast cancer Sister       reports that he quit smoking about 22 years ago. His smoking use included cigarettes. He started smoking about 41 years ago. He has a 10 pack-year smoking history. He has never used smokeless tobacco. He reports current alcohol use of about 12.0 standard drinks of alcohol per week. He reports that he does not use drugs.  Current Outpatient Medications   Medication Instructions   • CALCIUM PO Daily   • Capsicum, Cayenne, (CAYENNE PO) Daily   • Coenzyme Q10  "(CO Q 10 PO) Daily   • fish oil 1,000 mg, Daily   • Ginkgo Biloba 40 MG TABS Daily   • MAGNESIUM PO Daily   • montelukast (SINGULAIR) 10 mg, Oral, Daily at bedtime   • multivitamin (THERAGRAN) TABS 1 tablet, Daily   • NIACIN PO Daily   • NON FORMULARY Daily   • TURMERIC PO Take by mouth   • Tyrosine 500 MG CAPS Daily     Allergies   Allergen Reactions   • Latex Rash   • Other Blisters     Silk tape   • Penicillins Rash      Current Outpatient Medications on File Prior to Visit   Medication Sig Dispense Refill   • CALCIUM PO Take by mouth daily     • Capsicum, Cayenne, (CAYENNE PO) Take by mouth in the morning     • Coenzyme Q10 (CO Q 10 PO) Take by mouth daily     • Ginkgo Biloba 40 MG TABS Take by mouth in the morning     • MAGNESIUM PO Take by mouth in the morning     • multivitamin (THERAGRAN) TABS Take 1 tablet by mouth in the morning.     • NIACIN PO Take by mouth daily     • NON FORMULARY in the morning DMAE capsule     • Omega-3 Fatty Acids (fish oil) 1,000 mg Take 1,000 mg by mouth in the morning.     • TURMERIC PO Take by mouth     • Tyrosine 500 MG CAPS Take by mouth daily     • [DISCONTINUED] montelukast (SINGULAIR) 10 mg tablet Take 1 tablet by mouth daily at bedtime. 90 tablet 1     No current facility-administered medications on file prior to visit.      Social History     Tobacco Use   • Smoking status: Former     Current packs/day: 0.00     Average packs/day: 0.5 packs/day for 20.0 years (10.0 ttl pk-yrs)     Types: Cigarettes     Start date: 6/15/1983     Quit date: 2003     Years since quittin.3   • Smokeless tobacco: Never   • Tobacco comments:     Quit in    Vaping Use   • Vaping status: Never Used   Substance and Sexual Activity   • Alcohol use: Yes     Alcohol/week: 12.0 standard drinks of alcohol     Types: 8 Cans of beer, 4 Standard drinks or equivalent per week     Comment: \"ONLY ON WEEKENDS\"   • Drug use: No   • Sexual activity: Yes     Partners: Female     Birth " "control/protection: Post-menopausal, None       Objective   /82 (BP Location: Left arm, Patient Position: Sitting)   Pulse 58   Temp 97.8 °F (36.6 °C) (Temporal)   Ht 5' 10\" (1.778 m)   Wt 125 kg (274 lb 12.8 oz)   SpO2 96%   BMI 39.43 kg/m²     Physical Exam  Constitutional:       General: He is not in acute distress.     Appearance: He is well-developed. He is not diaphoretic.   Eyes:      General: No scleral icterus.     Pupils: Pupils are equal, round, and reactive to light.   Cardiovascular:      Rate and Rhythm: Normal rate and regular rhythm.      Heart sounds: Normal heart sounds. No murmur heard.  Pulmonary:      Effort: Pulmonary effort is normal. No respiratory distress.      Breath sounds: Normal breath sounds. No wheezing.   Abdominal:      General: Bowel sounds are normal. There is no distension.      Palpations: Abdomen is soft.      Tenderness: There is no abdominal tenderness.   Skin:     General: Skin is warm and dry.      Findings: No rash.   Neurological:      Mental Status: He is alert and oriented to person, place, and time.         "

## 2025-04-29 NOTE — PROGRESS NOTES
"Name: James Street      : 1965      MRN: [id number]  Encounter Provider: Wily Piper MD  Encounter Date: 2025   Encounter department: [facility]  :  Assessment & Plan  Impaired fasting glucose  Recommend a low cab diet       Allergic rhinitis due to pollen, unspecified seasonality    Orders:  •  montelukast (SINGULAIR) 10 mg tablet; Take 1 tablet (10 mg total) by mouth daily at bedtime    Phakomatosis, unspecified (HCC)  F/U with dermatology       Psoriasis  F/U with dermatology       Annual physical exam  See above    Follow up in 1 year              History of Present Illness   Patient is here for a check up.  Had blood work done which showed borderline elevated glucose.  Also has psoriasis and phakomatosis gets treated by dermatology, Dr. Doyle.      Review of Systems   Constitutional:  Negative for activity change, appetite change, fatigue and fever.   HENT:  Negative for congestion and ear discharge.    Respiratory:  Negative for cough and shortness of breath.    Cardiovascular:  Negative for chest pain and palpitations.   Gastrointestinal:  Negative for diarrhea and nausea.   Musculoskeletal:  Negative for arthralgias and back pain.   Skin:  Positive for color change and rash.   Neurological:  Negative for dizziness and headaches.   Psychiatric/Behavioral:  Negative for agitation and behavioral problems.        Objective   /82 (BP Location: Left arm, Patient Position: Sitting)   Pulse 58   Temp 97.8 °F (36.6 °C) (Temporal)   Ht 5' 10\" (1.778 m)   Wt 125 kg (274 lb 12.8 oz)   SpO2 96%   BMI 39.43 kg/m²      Physical Exam  Constitutional:       General: He is not in acute distress.     Appearance: He is well-developed. He is not diaphoretic.   Eyes:      General: No scleral icterus.     Pupils: Pupils are equal, round, and reactive to light.   Cardiovascular:      Rate and Rhythm: Normal rate and regular rhythm.      Heart sounds: Normal heart sounds. No " murmur heard.  Pulmonary:      Effort: Pulmonary effort is normal. No respiratory distress.      Breath sounds: Normal breath sounds. No wheezing.   Abdominal:      General: Bowel sounds are normal. There is no distension.      Palpations: Abdomen is soft.      Tenderness: There is no abdominal tenderness.   Skin:     General: Skin is warm and dry.      Findings: Erythema and rash present.      Comments: Has a psoriasis plaque on the right knee   Neurological:      Mental Status: He is alert and oriented to person, place, and time.

## 2025-04-29 NOTE — ASSESSMENT & PLAN NOTE
Mr. 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  His imaging does not demonstrate any new nodules or compression of airways on his CT scan.  He would like to continue to monitor this radiographically so we will see him back in 12 months with a repeat noncontrasted CT scan of the chest.  He knows that he can call the office at anytime with questions or concerns.    Orders:    CT chest wo contrast; Future

## 2025-04-29 NOTE — ASSESSMENT & PLAN NOTE
Orders:  •  montelukast (SINGULAIR) 10 mg tablet; Take 1 tablet (10 mg total) by mouth daily at bedtime   Bcc Infundibulocystic Histology Text: There were aggregates of basaloid cells demonstrating an infundibulocystic pattern.

## 2025-07-23 ENCOUNTER — OFFICE VISIT (OUTPATIENT)
Dept: GASTROENTEROLOGY | Facility: CLINIC | Age: 60
End: 2025-07-23
Payer: COMMERCIAL

## 2025-07-23 VITALS
WEIGHT: 272 LBS | SYSTOLIC BLOOD PRESSURE: 138 MMHG | OXYGEN SATURATION: 96 % | TEMPERATURE: 98.7 F | HEART RATE: 74 BPM | DIASTOLIC BLOOD PRESSURE: 80 MMHG | HEIGHT: 70 IN | BODY MASS INDEX: 38.94 KG/M2

## 2025-07-23 DIAGNOSIS — K64.5 HEMORRHOID THROMBOSIS: ICD-10-CM

## 2025-07-23 DIAGNOSIS — K64.0 GRADE I HEMORRHOIDS: Primary | ICD-10-CM

## 2025-07-23 PROCEDURE — 99214 OFFICE O/P EST MOD 30 MIN: CPT | Performed by: INTERNAL MEDICINE

## 2025-07-23 RX ORDER — IBUPROFEN 600 MG/1
1 TABLET, FILM COATED ORAL 4 TIMES DAILY
COMMUNITY
Start: 2025-04-16

## 2025-07-23 RX ORDER — HYDROCORTISONE 25 MG/G
CREAM TOPICAL 2 TIMES DAILY
Qty: 100 G | Refills: 3 | Status: SHIPPED | OUTPATIENT
Start: 2025-07-23

## 2025-07-23 RX ORDER — HYDROCORTISONE ACETATE 25 MG/1
25 SUPPOSITORY RECTAL
Qty: 14 SUPPOSITORY | Refills: 0 | Status: SHIPPED | OUTPATIENT
Start: 2025-07-23

## 2025-07-24 NOTE — PROGRESS NOTES
Name: James Street      : 1965      MRN: [id number]  Encounter Provider: Johnny Del Toro DO  Encounter Date: 2025   Encounter department: [facility] [place]    :  Assessment & Plan  Grade I hemorrhoids    Orders:    hydrocortisone (ANUSOL-HC) 2.5 % rectal cream; Apply topically 2 (two) times a day    hydrocortisone (ANUSOL-HC) 25 mg suppository; Insert 1 suppository (25 mg total) into the rectum daily at bedtime    Ambulatory Referral to Colorectal Surgery; Future    Hemorrhoid thrombosis    Orders:    Ambulatory Referral to Colorectal Surgery; Future      Assessment & Plan  1. Hemorrhoids:  - Apply topical cream containing 2.5% prednisone twice daily for 2 weeks.  - Use suppositories once daily at bedtime for 2 weeks.  - Avoid prolonged sitting on the toilet to prevent further engorgement of hemorrhoidal veins.  - Referral to a colorectal surgeon for further evaluation and potential surgical intervention if the hemorrhoid is thrombosed.      History of Present Illness     History of Present Illness  The patient presents for evaluation of hemorrhoids.    He experienced a severe flare-up of hemorrhoids on 2025, which was so large and uncomfortable that it hindered bowel movements. He has noticed a correlation between consuming spicy foods and the occurrence of bleeding. The condition initially improved with the use of creams but has since worsened. He can feel the hemorrhoids when wiping, and they are painful to touch. The pain is not severe enough to disrupt sleep, but he describes it as similar to a toothache. He has never undergone surgery for hemorrhoids. He spends extended periods on the toilet due to cell phone use. He reports no issues with nausea, vomiting, or heartburn. He has multiple hemorrhoids, one of which is particularly large. He expresses concern about the possibility of rupture. 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 He has had difficulty inserting suppositories in the past as they melt in his hands before he can insert them. He has been using over-the-counter creams for relief.    He was advised to return for a colonoscopy in 3 years due to the discovery of 3 polyps.    SOCIAL HISTORY  Occupation: Public        Objective   /80 (Patient Position: Sitting, Cuff Size: Large)   Pulse 74   Temp 98.7 °F (37.1 °C) (Tympanic)   Ht 5' 10\" (1.778 m)   Wt 123 kg (272 lb)   SpO2 96%   BMI 39.03 kg/m²     Physical Exam  Lungs: Clear to auscultation bilaterally  Rectal: Multiple external hemorrhoids noted, one appears thrombosed  Physical Exam  Vitals reviewed.   Constitutional:       General: He is not in acute distress.     Appearance: Normal appearance. He is not ill-appearing.     Eyes:      General: No scleral icterus.     Extraocular Movements: Extraocular movements intact.      Pupils: Pupils are equal, round, and reactive to light.       Cardiovascular:      Rate and Rhythm: Normal rate and regular rhythm.      Pulses: Normal pulses.      Heart sounds: Normal heart sounds. No murmur heard.  Pulmonary:      Effort: Pulmonary effort is normal.      Breath sounds: Normal breath sounds. No wheezing, rhonchi or rales.   Abdominal:      Palpations: Abdomen is soft. There is no mass.      Tenderness: There is no abdominal tenderness. There is no guarding or rebound.   Genitourinary:     Comments: 3 external hemorrhoids were present.  The largest hemorrhoid had a questionable thrombosis.    Musculoskeletal:      Right lower leg: No edema.      Left lower leg: No edema.     Skin:     General: Skin is warm and dry.      Coloration: Skin is not jaundiced.      Findings: No rash.     Neurological:      General: No focal deficit present.      Mental Status: He is alert and oriented to person, place, and time.     Psychiatric:         Mood and Affect: Mood normal.         Behavior: " Behavior normal.

## 2025-07-29 ENCOUNTER — OFFICE VISIT (OUTPATIENT)
Age: 60
End: 2025-07-29
Payer: COMMERCIAL

## 2025-07-29 VITALS
BODY MASS INDEX: 38.94 KG/M2 | WEIGHT: 272 LBS | HEIGHT: 70 IN | SYSTOLIC BLOOD PRESSURE: 131 MMHG | DIASTOLIC BLOOD PRESSURE: 79 MMHG | HEART RATE: 73 BPM | OXYGEN SATURATION: 96 %

## 2025-07-29 DIAGNOSIS — K64.0 GRADE I HEMORRHOIDS: ICD-10-CM

## 2025-07-29 DIAGNOSIS — E66.812 CLASS 2 SEVERE OBESITY DUE TO EXCESS CALORIES WITH SERIOUS COMORBIDITY AND BODY MASS INDEX (BMI) OF 39.0 TO 39.9 IN ADULT (HCC): ICD-10-CM

## 2025-07-29 DIAGNOSIS — E66.01 CLASS 2 SEVERE OBESITY DUE TO EXCESS CALORIES WITH SERIOUS COMORBIDITY AND BODY MASS INDEX (BMI) OF 39.0 TO 39.9 IN ADULT (HCC): ICD-10-CM

## 2025-07-29 DIAGNOSIS — K64.5 HEMORRHOID THROMBOSIS: Primary | ICD-10-CM

## 2025-07-29 PROCEDURE — 99202 OFFICE O/P NEW SF 15 MIN: CPT | Performed by: COLON & RECTAL SURGERY

## (undated) DEVICE — STOPCOCK 4-WAY

## (undated) DEVICE — ADAPTOR TRACH SWIVEL

## (undated) DEVICE — SYRINGE 10ML SLIP TIP LF

## (undated) DEVICE — DEVON™ KNEE AND BODY STRAP 80" X 4" (1.5 M X 10.2 CM): Brand: CARDINAL HEALTH

## (undated) DEVICE — BRONCHOSCOPE SHEATH VALVE: Brand: BRONCHOSCOPE SHEATH VALVE

## (undated) DEVICE — BRONCHOSCOPE MONARCH SNGL USE

## (undated) DEVICE — TUBING SUCTION 5MM X 12 FT

## (undated) DEVICE — SPECIMEN CONTAINER STERILE PEEL PACK

## (undated) DEVICE — Device: Brand: BALLOON

## (undated) DEVICE — IV EXTENSION TUBING 33 IN

## (undated) DEVICE — HEAVY DUTY TABLE COVER: Brand: CONVERTORS

## (undated) DEVICE — UTILITY MARKER,BLACK WITH LABELS: Brand: DEVON

## (undated) DEVICE — SYRINGE 20ML LL

## (undated) DEVICE — SINGLE USE BIOPSY VALVE MAJ-210: Brand: SINGLE USE BIOPSY VALVE (STERILE)

## (undated) DEVICE — BRONCHOSCOPE PATIENT INTRODUCER KIT: Brand: BRONCHOSCOPE PATIENT INTRODUCER KIT

## (undated) DEVICE — SYRINGE 10ML LL

## (undated) DEVICE — SINGLE USE SUCTION VALVE MAJ-209: Brand: SINGLE USE SUCTION VALVE (STERILE)

## (undated) DEVICE — SPECIMEN TRAP: Brand: ARGYLE

## (undated) DEVICE — FIRST STEP BEDSIDE KIT - STAND-UP POUCH, ENDOSCOPIC CLEANING PAD - 1 POUCH: Brand: FIRST STEP BEDSIDE KIT - STAND-UP POUCH, ENDOSCOPIC CLEANING PAD

## (undated) DEVICE — STERILE EMESIS BASIN                 070: Brand: CARDINAL HEALTH

## (undated) DEVICE — GAUZE SPONGES,16 PLY: Brand: CURITY

## (undated) DEVICE — NAVIGATION PATIENT PATCHES (QUANTITY OF 60): Brand: NAVIGATION PATIENT PATCHES

## (undated) DEVICE — NEEDLE TBNA PERIVIEW FLEX 21GA 20MM

## (undated) DEVICE — CYTOLOGY BRUSH: Brand: CYTOLOGY BRUSH